# Patient Record
Sex: MALE | Race: BLACK OR AFRICAN AMERICAN | Employment: OTHER | URBAN - METROPOLITAN AREA
[De-identification: names, ages, dates, MRNs, and addresses within clinical notes are randomized per-mention and may not be internally consistent; named-entity substitution may affect disease eponyms.]

---

## 2017-04-14 ENCOUNTER — OP HISTORICAL/CONVERTED ENCOUNTER (OUTPATIENT)
Dept: OTHER | Age: 72
End: 2017-04-14

## 2017-05-12 ENCOUNTER — OP HISTORICAL/CONVERTED ENCOUNTER (OUTPATIENT)
Dept: OTHER | Age: 72
End: 2017-05-12

## 2017-11-11 ENCOUNTER — IP HISTORICAL/CONVERTED ENCOUNTER (OUTPATIENT)
Dept: OTHER | Age: 72
End: 2017-11-11

## 2020-10-02 ENCOUNTER — TELEPHONE (OUTPATIENT)
Dept: INTERNAL MEDICINE CLINIC | Age: 75
End: 2020-10-02

## 2020-10-02 RX ORDER — ISOSORBIDE MONONITRATE 30 MG/1
30 TABLET, EXTENDED RELEASE ORAL
Qty: 90 TAB | Refills: 3 | Status: SHIPPED | OUTPATIENT
Start: 2020-10-02 | End: 2021-09-07

## 2020-10-02 NOTE — TELEPHONE ENCOUNTER
Patient came into the office and needs a refill on his Isosorbide Mono ER Tabs 30mg 1 tab daily sent to [facility] - pt has appt 10/19/2020 8AM

## 2020-10-16 PROBLEM — N18.6 END STAGE RENAL FAILURE ON DIALYSIS (HCC): Status: ACTIVE | Noted: 2020-10-16

## 2020-10-16 PROBLEM — I50.9 CHF (CONGESTIVE HEART FAILURE) (HCC): Status: ACTIVE | Noted: 2020-10-16

## 2020-10-16 PROBLEM — D64.9 ANEMIA: Status: ACTIVE | Noted: 2020-10-16

## 2020-10-16 PROBLEM — I49.5 SINUS NODE DYSFUNCTION (HCC): Status: ACTIVE | Noted: 2020-10-16

## 2020-10-16 PROBLEM — J44.9 COPD (CHRONIC OBSTRUCTIVE PULMONARY DISEASE) (HCC): Status: ACTIVE | Noted: 2020-10-16

## 2020-10-16 PROBLEM — K11.7 EXCESSIVE SALIVATION: Status: ACTIVE | Noted: 2020-10-16

## 2020-10-16 PROBLEM — I10 ESSENTIAL HYPERTENSION: Status: ACTIVE | Noted: 2020-10-16

## 2020-10-16 PROBLEM — M25.559 HIP PAIN: Status: ACTIVE | Noted: 2020-10-16

## 2020-10-16 PROBLEM — Z95.0 CARDIAC PACEMAKER IN SITU: Status: ACTIVE | Noted: 2020-10-16

## 2020-10-16 PROBLEM — M70.20 OLECRANON BURSITIS: Status: ACTIVE | Noted: 2020-10-16

## 2020-10-16 PROBLEM — Z90.2 HISTORY OF LOBECTOMY OF LUNG: Status: ACTIVE | Noted: 2020-10-16

## 2020-10-16 PROBLEM — R07.81 PLEURITIC PAIN: Status: ACTIVE | Noted: 2020-10-16

## 2020-10-16 PROBLEM — Z99.2 END STAGE RENAL FAILURE ON DIALYSIS (HCC): Status: ACTIVE | Noted: 2020-10-16

## 2020-10-16 PROBLEM — I25.10 CORONARY ARTERIOSCLEROSIS IN NATIVE ARTERY: Status: ACTIVE | Noted: 2020-10-16

## 2020-10-16 PROBLEM — J84.112 IDIOPATHIC PULMONARY FIBROSIS (HCC): Status: ACTIVE | Noted: 2020-10-16

## 2020-10-19 ENCOUNTER — OFFICE VISIT (OUTPATIENT)
Dept: INTERNAL MEDICINE CLINIC | Age: 75
End: 2020-10-19
Payer: MEDICARE

## 2020-10-19 VITALS
WEIGHT: 161.2 LBS | SYSTOLIC BLOOD PRESSURE: 150 MMHG | BODY MASS INDEX: 25.91 KG/M2 | TEMPERATURE: 98 F | HEART RATE: 72 BPM | HEIGHT: 66 IN | RESPIRATION RATE: 18 BRPM | DIASTOLIC BLOOD PRESSURE: 60 MMHG | OXYGEN SATURATION: 99 %

## 2020-10-19 DIAGNOSIS — Z90.2 HISTORY OF LOBECTOMY OF LUNG: ICD-10-CM

## 2020-10-19 DIAGNOSIS — I49.5 SINUS NODE DYSFUNCTION (HCC): ICD-10-CM

## 2020-10-19 DIAGNOSIS — N18.6 END STAGE RENAL FAILURE ON DIALYSIS (HCC): ICD-10-CM

## 2020-10-19 DIAGNOSIS — J84.112 IDIOPATHIC PULMONARY FIBROSIS (HCC): ICD-10-CM

## 2020-10-19 DIAGNOSIS — Z85.118 HISTORY OF LUNG CANCER: ICD-10-CM

## 2020-10-19 DIAGNOSIS — I25.10 CORONARY ARTERIOSCLEROSIS IN NATIVE ARTERY: ICD-10-CM

## 2020-10-19 DIAGNOSIS — Z99.2 END STAGE RENAL FAILURE ON DIALYSIS (HCC): ICD-10-CM

## 2020-10-19 DIAGNOSIS — Z95.0 CARDIAC PACEMAKER IN SITU: ICD-10-CM

## 2020-10-19 DIAGNOSIS — I10 ESSENTIAL HYPERTENSION: Primary | ICD-10-CM

## 2020-10-19 PROCEDURE — 1101F PT FALLS ASSESS-DOCD LE1/YR: CPT | Performed by: INTERNAL MEDICINE

## 2020-10-19 PROCEDURE — G8510 SCR DEP NEG, NO PLAN REQD: HCPCS | Performed by: INTERNAL MEDICINE

## 2020-10-19 PROCEDURE — 3017F COLORECTAL CA SCREEN DOC REV: CPT | Performed by: INTERNAL MEDICINE

## 2020-10-19 PROCEDURE — G8419 CALC BMI OUT NRM PARAM NOF/U: HCPCS | Performed by: INTERNAL MEDICINE

## 2020-10-19 PROCEDURE — 99214 OFFICE O/P EST MOD 30 MIN: CPT | Performed by: INTERNAL MEDICINE

## 2020-10-19 PROCEDURE — G8427 DOCREV CUR MEDS BY ELIG CLIN: HCPCS | Performed by: INTERNAL MEDICINE

## 2020-10-19 PROCEDURE — G8536 NO DOC ELDER MAL SCRN: HCPCS | Performed by: INTERNAL MEDICINE

## 2020-10-19 RX ORDER — ALBUTEROL SULFATE 90 UG/1
2 AEROSOL, METERED RESPIRATORY (INHALATION) AS NEEDED
COMMUNITY
End: 2020-10-19 | Stop reason: SDUPTHER

## 2020-10-19 RX ORDER — ALBUTEROL SULFATE 90 UG/1
2 AEROSOL, METERED RESPIRATORY (INHALATION)
Qty: 3 INHALER | Refills: 3 | Status: SHIPPED | OUTPATIENT
Start: 2020-10-19 | End: 2021-10-28

## 2020-10-19 RX ORDER — NIFEDIPINE 60 MG/1
60 TABLET, EXTENDED RELEASE ORAL 2 TIMES DAILY
Qty: 180 TAB | Refills: 3 | Status: SHIPPED | OUTPATIENT
Start: 2020-10-19 | End: 2021-09-21

## 2020-10-19 RX ORDER — ACETAMINOPHEN 325 MG/1
1 TABLET ORAL AS NEEDED
COMMUNITY

## 2020-10-19 RX ORDER — CLONIDINE HYDROCHLORIDE 0.1 MG/1
1 TABLET ORAL 2 TIMES DAILY
COMMUNITY
Start: 2020-09-20 | End: 2020-12-29

## 2020-10-19 RX ORDER — NIFEDIPINE 60 MG/1
1 TABLET, EXTENDED RELEASE ORAL 2 TIMES DAILY
COMMUNITY
End: 2020-10-19 | Stop reason: SDUPTHER

## 2020-10-19 RX ORDER — ASPIRIN 81 MG/1
1 TABLET ORAL DAILY
COMMUNITY

## 2020-10-19 RX ORDER — ERGOCALCIFEROL (VITAMIN D2) 10 MCG
1 TABLET ORAL DAILY
COMMUNITY

## 2020-10-19 RX ORDER — LABETALOL 200 MG/1
1 TABLET, FILM COATED ORAL 3 TIMES DAILY
COMMUNITY
End: 2020-12-29

## 2020-10-19 RX ORDER — MINOXIDIL 2.5 MG/1
1 TABLET ORAL 2 TIMES DAILY
COMMUNITY
Start: 2020-09-20 | End: 2020-12-29

## 2020-10-19 RX ORDER — LANOLIN ALCOHOL/MO/W.PET/CERES
1 CREAM (GRAM) TOPICAL DAILY
COMMUNITY

## 2020-10-19 RX ORDER — HYDRALAZINE HYDROCHLORIDE 25 MG/1
1 TABLET, FILM COATED ORAL 3 TIMES DAILY
COMMUNITY
Start: 2020-09-20 | End: 2020-12-29

## 2020-10-19 NOTE — PROGRESS NOTES
Shantell Segal is a 77 y.o. male and presents with Hypertension and Follow Up Chronic Condition    Overall he is feeling good even though he has multiple medical problems related to kidney heart and lung he is getting dialysis 3 days a week, Tuesday Thursday and Saturday he also follows oncologist at [facility], under [facility] with Dr. Basil Snow according to him he had cancer of the lung and then it spread to the lymph node he finished chemotherapy going to have repeat CT scan, he was told he does not need radiation therapy for now but, further treatment will be decided after having CT scan results he has been consulted by cardiologist Dr. Ajay Lantigua, he is having pacemaker, going to have a pacemaker interrogation overall he is able to walk 1 mile per day without getting short of breath he never misses dialysis, he has no fever chills cough he ran out of rescue inhaler he cannot afford maintenance inhaler he has, upcoming appointment with his lung doctor Dr. Norma Fong, who provide the same both inhalers I sent request for rescue inhaler to the pharmacy he does not have depression, he helps himself and his wife who had surgery on her right arm, he has fair appetite he is going to have flu shot at dialysis center he refuses today according to him he has taken pneumonia shot he does not recall  it is Prevnar. 13 or Pneumovax 23    I explained and education given. His blood pressure is, systolic slightly high but I will not make changes otherwise it is controlled he is on multiple medications. ,  Denies dizziness . Review of Systems    Review of Systems   Constitutional: Negative. HENT: Negative for sinus pain. Eyes: Negative for blurred vision. Respiratory: Negative. Cardiovascular: Negative. Gastrointestinal: Negative. Genitourinary: Negative. Musculoskeletal: Negative. Neurological: Negative for dizziness, tingling, tremors, sensory change and headaches.    Endo/Heme/Allergies: Negative for polydipsia. Psychiatric/Behavioral: Negative for depression and substance abuse. The patient does not have insomnia. Past Medical History:   Diagnosis Date    Anemia 10/16/2020    Cardiac pacemaker in situ 10/16/2020    CHF (congestive heart failure) ([facility] [address] 10/16/2020    COPD (chronic obstructive pulmonary disease) ([facility] [address] 10/16/2020    Coronary arteriosclerosis in native artery 10/16/2020    End stage renal failure on dialysis [facility]) 10/16/2020    Essential hypertension 10/16/2020    Excessive salivation 10/16/2020    H/O hernia repair 03/27/2013    Hip pain 10/16/2020    History of lobectomy of lung 10/16/2020    History of lobectomy of lung     Idiopathic pulmonary fibrosis ([facility] [address] 10/16/2020    Olecranon bursitis 10/16/2020    Pleuritic pain 10/16/2020    Sinus node dysfunction ([facility] [address] 10/16/2020     Past Surgical History:   Procedure Laterality Date    HX COLONOSCOPY      HX PACEMAKER      HX PACEMAKER PLACEMENT      VASCULAR SURGERY PROCEDURE UNLIST       Social History     Socioeconomic History    Marital status:      Spouse name: Not on file    Number of children: Not on file    Years of education: Not on file    Highest education level: Not on file   Tobacco Use    Smoking status: Former Smoker    Smokeless tobacco: Never Used    Tobacco comment: QUIT 1970   Substance and Sexual Activity    Alcohol use: Not Currently    Drug use: Never     Family History   Problem Relation Age of Onset    Diabetes Mother      Current Outpatient Medications   Medication Sig Dispense Refill    hydrALAZINE (APRESOLINE) 25 mg tablet Take 1 Tab by mouth three (3) times daily.  minoxidiL (LONITEN) 2.5 mg tablet Take 1 Tab by mouth two (2) times a day.  cyanocobalamin 1,000 mcg tablet Take 1 Tab by mouth daily.  aspirin delayed-release 81 mg tablet Take 1 Tab by mouth daily.  multivit-min/FA/lycopen/lutein (CENTRUM SILVER MEN PO) Take 1 Tab by mouth daily.       ergocalciferol, vitamin d2, 10 mcg (400 unit) tab Take 1 Tab by mouth daily.  labetaloL (NORMODYNE) 200 mg tablet Take 1 Tab by mouth three (3) times daily.  NIFEdipine ER (ADALAT CC) 60 mg ER tablet Take 1 Tab by mouth two (2) times a day.  acetaminophen (TYLENOL) 325 mg tablet Take 1 Tab by mouth as needed.  albuterol (PROVENTIL HFA, VENTOLIN HFA, PROAIR HFA) 90 mcg/actuation inhaler Take 2 Puffs by inhalation every four (4) hours as needed for Wheezing. Indications: asthma attack 3 Inhaler 3    isosorbide mononitrate ER (IMDUR) 30 mg tablet Take 1 Tab by mouth every morning. Indications: prevention of anginal chest pain associated with coronary artery disease 90 Tab 3    calcium acetate,phosphat bind, (PHOSLYRA) 667 mg (169 mg calcium)/5 mL soln ORAL SOLUTION Take 1 Tab by mouth three (3) times daily.  cloNIDine HCL (CATAPRES) 0.1 mg tablet Take 1 Tab by mouth two (2) times a day. Allergies   Allergen Reactions    Pcn [Penicillins] Nausea and Vomiting     ALSO CAUSES HA'S       Objective:  Visit Vitals  BP (!) 150/60 (BP 1 Location: Right arm, BP Patient Position: Sitting)   Pulse 72   Temp 98 °F (36.7 °C) (Temporal)   Resp 18   Ht 5' 6\" (1.676 m)   Wt 161 lb 3.2 oz (73.1 kg)   SpO2 99%   BMI 26.02 kg/m²       Physical Exam:   Constitutional: General Appearance: Age-appropriate pleasant personality. Level of Distress: NAD. Psychiatric: Mental Status: normal mood and affect Orientation: to time, place, and person. ,normal eye contact. Head: Head: normocephalic and atraumatic. Eyes: Pupils: PERRLA. Sclerae: non-icteric. Neck: Neck: supple, trachea midline, and no masses. Lymph Nodes: no cervical LAD. Thyroid: no enlargement or nodules and non-tender. Lungs: Respiratory effort: no dyspnea. Auscultation: no wheezing, rales/crackles, or rhonchi and breath sounds normal and good air movement. Cardiovascular: Apical Impulse: not displaced.  Heart Auscultation: normal S1 and S2; no murmurs, rubs, or gallops; and RRR. Neck vessels: no carotid bruits. Pulses including femoral / pedal: normal throughout. Abdomen: Bowel Sounds: normal. Inspection and Palpation: no tenderness, guarding, or masses and soft and non-distended. Liver: non-tender and no hepatomegaly. Spleen: non-tender and no splenomegaly. Musculoskeletal[de-identified] Extremities: no edema,no varicosities. No Calf tenderness. AV fistula left forearm  Neurologic: Gait and Station: normal gait and station. Motor Strength normal right and left. Skin: Inspection and palpation: no rash, lesions, or ulcer. No results found for this or any previous visit. Assessment/Plan:      ICD-10-CM ICD-9-CM    1. Essential hypertension  I10 401.9    2. End stage renal failure on dialysis (HCC)  N18.6 585.6     Z99.2 V45.11    3. Idiopathic pulmonary fibrosis ([facility]  J84.112 516.31    4. History of lung cancer  Z85. 118 V10.11    5. History of lobectomy of lung  Z90.2 V12.59    6. Coronary arteriosclerosis in native artery  I25.10 414.01    7. Cardiac pacemaker in situ  Z95.0 V45.01    8. Sinus node dysfunction (HCC)  I49.5 427.81      Orders Placed This Encounter    calcium acetate,phosphat bind, (PHOSLYRA) 667 mg (169 mg calcium)/5 mL soln ORAL SOLUTION     Sig: Take 1 Tab by mouth three (3) times daily.  cloNIDine HCL (CATAPRES) 0.1 mg tablet     Sig: Take 1 Tab by mouth two (2) times a day.  hydrALAZINE (APRESOLINE) 25 mg tablet     Sig: Take 1 Tab by mouth three (3) times daily.  minoxidiL (LONITEN) 2.5 mg tablet     Sig: Take 1 Tab by mouth two (2) times a day.  cyanocobalamin 1,000 mcg tablet     Sig: Take 1 Tab by mouth daily.  aspirin delayed-release 81 mg tablet     Sig: Take 1 Tab by mouth daily.  multivit-min/FA/lycopen/lutein (CENTRUM SILVER MEN PO)     Sig: Take 1 Tab by mouth daily.  ergocalciferol, vitamin d2, 10 mcg (400 unit) tab     Sig: Take 1 Tab by mouth daily.     labetaloL (NORMODYNE) 200 mg tablet Sig: Take 1 Tab by mouth three (3) times daily.  NIFEdipine ER (ADALAT CC) 60 mg ER tablet     Sig: Take 1 Tab by mouth two (2) times a day.  acetaminophen (TYLENOL) 325 mg tablet     Sig: Take 1 Tab by mouth as needed.  DISCONTD: albuterol (PROVENTIL HFA, VENTOLIN HFA, PROAIR HFA) 90 mcg/actuation inhaler     Sig: Take 2 Puffs by inhalation as needed.  albuterol (PROVENTIL HFA, VENTOLIN HFA, PROAIR HFA) 90 mcg/actuation inhaler     Sig: Take 2 Puffs by inhalation every four (4) hours as needed for Wheezing. Indications: asthma attack     Dispense:  3 Inhaler     Refill:  3       Hypertension  systolic is slightly high but overall it is controlle taking dialysis 3 days a week and having heart conditiond overall it remains controlled I will not make changes continued on hydralazine 25 mg every 8 hourly, minoxidil 2.5 mg twice a day, labetalol 200 mg 3 times a day, nifedipine ER 60 mg twice a day, and clonidine 0.1 mg twice a day. Renal diet he told me he is compliant taking renal diet.    , End-stage renal disease on hemodialysis taking Tuesday Thursday and Saturday he has AV fistula in left forearm, he is under care of nephrologist he never misses dialysis and he is particular regarding his diet. Also having history of CHF in the past.    Coronary artery disease with history of CHF and history of sinus  arrhythmia and status post pacemaker recently he had, visit with cardiologist, he is going to have pacemaker interrogation. Cardiac diet. Continue low-dose aspirin. History of COPD and lung fibrosis and history of lung cancer in left side and spread to the lymph node he follows [facility]. Louise [facility], awaiting the notes, for the most recent notes according to him he finished chemotherapy last week and going to have CT scan depending on the CT scan further plan will be decided by his oncologist and according to him he does not need radiation therapy.     Having COPD he needs rescue and maintenance inhaler he is having appointment with his pulmonologist and I gave him refills for rescue inhaler, he is going to discuss regarding the cost of the maintenance inhaler that he cannot afford with his pulmonologist.    He is on polypharmacy but he is compliant taking medicines continue multivitamin with renal diet and heart healthy diet and if any concerns do not hesitate to call me follow-up in 4 weeks. He had registered for getting flu shot at his dialysis clinic so he refused today here I explained him and educated about Prevnar 13 Pneumovax, and he claimed that he has taken one of the pneumonia vaccine so he will find out and then the second 1 will be given and also he told me that he was under chemo so he is not a candidate to be on Shingrix but he refuses to get shingles vaccine. Follow-up in 4 months. Education and counseling given. follow low fat diet, follow low salt diet, continue present plan, call if any problems    There are no Patient Instructions on file for this visit. Follow-up and Dispositions    · Return in about 4 months (around 2/19/2021) for htn ,esrd,copd ,lung and heart problems.

## 2020-10-19 NOTE — TELEPHONE ENCOUNTER
Patient stated Dr. Teo Calderón wanted to know what medication he needs refill and he stated he needs Nifedipine ER 60 mg. He has other medications but is out of the above now. Please send to [facility].

## 2021-02-19 ENCOUNTER — OFFICE VISIT (OUTPATIENT)
Dept: INTERNAL MEDICINE CLINIC | Age: 76
End: 2021-02-19
Payer: MEDICARE

## 2021-02-19 VITALS
TEMPERATURE: 98.5 F | OXYGEN SATURATION: 99 % | HEART RATE: 72 BPM | BODY MASS INDEX: 25.84 KG/M2 | SYSTOLIC BLOOD PRESSURE: 158 MMHG | HEIGHT: 66 IN | WEIGHT: 160.8 LBS | RESPIRATION RATE: 20 BRPM | DIASTOLIC BLOOD PRESSURE: 74 MMHG

## 2021-02-19 DIAGNOSIS — J84.112 IDIOPATHIC PULMONARY FIBROSIS (HCC): ICD-10-CM

## 2021-02-19 DIAGNOSIS — I50.9 CONGESTIVE HEART FAILURE, UNSPECIFIED HF CHRONICITY, UNSPECIFIED HEART FAILURE TYPE (HCC): ICD-10-CM

## 2021-02-19 DIAGNOSIS — Z90.2 HISTORY OF LOBECTOMY OF LUNG: ICD-10-CM

## 2021-02-19 DIAGNOSIS — I49.5 SINUS NODE DYSFUNCTION (HCC): ICD-10-CM

## 2021-02-19 DIAGNOSIS — J44.9 CHRONIC OBSTRUCTIVE PULMONARY DISEASE, UNSPECIFIED COPD TYPE (HCC): ICD-10-CM

## 2021-02-19 DIAGNOSIS — Z99.2 END STAGE RENAL FAILURE ON DIALYSIS (HCC): ICD-10-CM

## 2021-02-19 DIAGNOSIS — Z95.0 CARDIAC PACEMAKER IN SITU: ICD-10-CM

## 2021-02-19 DIAGNOSIS — N18.6 END STAGE RENAL FAILURE ON DIALYSIS (HCC): ICD-10-CM

## 2021-02-19 DIAGNOSIS — Z85.118 HISTORY OF LUNG CANCER: ICD-10-CM

## 2021-02-19 DIAGNOSIS — I10 ESSENTIAL HYPERTENSION: ICD-10-CM

## 2021-02-19 DIAGNOSIS — I25.10 CORONARY ARTERIOSCLEROSIS IN NATIVE ARTERY: ICD-10-CM

## 2021-02-19 PROCEDURE — 99214 OFFICE O/P EST MOD 30 MIN: CPT | Performed by: INTERNAL MEDICINE

## 2021-02-19 PROCEDURE — G8419 CALC BMI OUT NRM PARAM NOF/U: HCPCS | Performed by: INTERNAL MEDICINE

## 2021-02-19 PROCEDURE — G8510 SCR DEP NEG, NO PLAN REQD: HCPCS | Performed by: INTERNAL MEDICINE

## 2021-02-19 PROCEDURE — G8427 DOCREV CUR MEDS BY ELIG CLIN: HCPCS | Performed by: INTERNAL MEDICINE

## 2021-02-19 PROCEDURE — 3017F COLORECTAL CA SCREEN DOC REV: CPT | Performed by: INTERNAL MEDICINE

## 2021-02-19 PROCEDURE — G8536 NO DOC ELDER MAL SCRN: HCPCS | Performed by: INTERNAL MEDICINE

## 2021-02-19 PROCEDURE — 1101F PT FALLS ASSESS-DOCD LE1/YR: CPT | Performed by: INTERNAL MEDICINE

## 2021-02-19 PROCEDURE — G9231 DOC ESRD DIA TRANS PREG: HCPCS | Performed by: INTERNAL MEDICINE

## 2021-02-19 NOTE — PROGRESS NOTES
Kimberlee Gonzales is a 77 y.o. male and presents with Follow-up, Hypertension, CHF, Anemia, and COPD     initially he wanted to have virtual visit however he could not do, he preferred to come in the office in person he is getting hemodialysis on, Tuesday Thursday and Saturday he has history of lobectomy of lung due to history of lung cancer, and also history of COPD with pulmonary fibrosis he is able to walk without any oxygen and no shortness of breath, no PND or chest pain, he is having stable, vitals his systolic is slightly elevated but he is getting dialysis and it is, controlled on 5 different antihypertensive medicines he has AV fistula in left forearm, no depression, he has going to have, CT scan of the neck and, and he had spread of the lesion to the lymph node but he  is under surveillance by oncologist.  He also follows cardiologist Dr. Ke Cam and follows pulmonologist.  He has no depression. He told me he is able to do his ADL and IADL and able to drive and he is willing to get COVID-19 vaccine but not desperate to get it. He is up-to-date with flu shot and pneumonia shot      Review of Systems    Review of Systems   Constitutional: Negative. HENT: Negative for sinus pain and sore throat. Eyes: Negative for blurred vision. Respiratory: Negative for cough, shortness of breath and wheezing. Cardiovascular: Negative. Gastrointestinal: Negative. Genitourinary: Negative. Musculoskeletal: Negative. Neurological: Negative for dizziness, tingling, tremors and headaches. Sensory change: monday    Endo/Heme/Allergies: Negative for polydipsia. Psychiatric/Behavioral: Negative for depression and memory loss. The patient is not nervous/anxious.          Past Medical History:   Diagnosis Date    Anemia 10/16/2020    Cardiac pacemaker in situ 10/16/2020    CHF (congestive heart failure) ([facility] [address] 10/16/2020    COPD (chronic obstructive pulmonary disease) ([facility] [address] 10/16/2020    Coronary arteriosclerosis in native artery 10/16/2020    End stage renal failure on dialysis [facility]) 10/16/2020    Essential hypertension 10/16/2020    Excessive salivation 10/16/2020    H/O hernia repair 03/27/2013    Hip pain 10/16/2020    History of lobectomy of lung 10/16/2020    History of lobectomy of lung     Idiopathic pulmonary fibrosis ([facility] [address] 10/16/2020    Olecranon bursitis 10/16/2020    Pleuritic pain 10/16/2020    Sinus node dysfunction ([facility] [address] 10/16/2020     Past Surgical History:   Procedure Laterality Date    HX COLONOSCOPY      HX PACEMAKER      HX PACEMAKER PLACEMENT      VASCULAR SURGERY PROCEDURE UNLIST       Social History     Socioeconomic History    Marital status:      Spouse name: Not on file    Number of children: Not on file    Years of education: Not on file    Highest education level: Not on file   Tobacco Use    Smoking status: Former Smoker    Smokeless tobacco: Never Used    Tobacco comment: QUIT 1970   Substance and Sexual Activity    Alcohol use: Not Currently     Frequency: Never     Binge frequency: Never    Drug use: Never     Family History   Problem Relation Age of Onset    Diabetes Mother      Current Outpatient Medications   Medication Sig Dispense Refill    cloNIDine HCL (CATAPRES) 0.1 mg tablet TAKE 1 TABLET EVERY 12 HOURS AS DIRECTED 180 Tab 1    hydrALAZINE (APRESOLINE) 25 mg tablet TAKE 1 TABLET EVERY 8 HOURS AS DIRECTED 270 Tab 1    labetaloL (NORMODYNE) 200 mg tablet TAKE 1 TABLET THREE TIMES A DAY AS DIRECTED 270 Tab 1    minoxidiL (LONITEN) 2.5 mg tablet TAKE 1 TABLET EVERY 12 HOURS AS DIRECTED 180 Tab 1    calcium acetate,phosphat bind, (PHOSLYRA) 667 mg (169 mg calcium)/5 mL soln ORAL SOLUTION Take 1 Tab by mouth three (3) times daily.  cyanocobalamin 1,000 mcg tablet Take 1 Tab by mouth daily.  aspirin delayed-release 81 mg tablet Take 1 Tab by mouth daily.       multivit-min/FA/lycopen/lutein (CENTRUM SILVER MEN PO) Take 1 Tab by mouth daily.  ergocalciferol, vitamin d2, 10 mcg (400 unit) tab Take 1 Tab by mouth daily.  acetaminophen (TYLENOL) 325 mg tablet Take 1 Tab by mouth as needed.  albuterol (PROVENTIL HFA, VENTOLIN HFA, PROAIR HFA) 90 mcg/actuation inhaler Take 2 Puffs by inhalation every four (4) hours as needed for Wheezing. Indications: asthma attack 3 Inhaler 3    NIFEdipine ER (ADALAT CC) 60 mg ER tablet Take 1 Tab by mouth two (2) times a day. Indications: high blood pressure 180 Tab 3    isosorbide mononitrate ER (IMDUR) 30 mg tablet Take 1 Tab by mouth every morning. Indications: prevention of anginal chest pain associated with coronary artery disease 90 Tab 3     Allergies   Allergen Reactions    Pcn [Penicillins] Nausea and Vomiting     ALSO CAUSES HA'S       Objective:  Visit Vitals  BP (!) 158/74 (BP 1 Location: Right upper arm, BP Patient Position: Sitting, BP Cuff Size: Adult)   Pulse 72   Temp 98.5 °F (36.9 °C) (Oral)   Resp 20   Ht 5' 6\" (1.676 m)   Wt 160 lb 12.8 oz (72.9 kg)   SpO2 99% Comment: RA   BMI 25.95 kg/m²       Physical Exam:   Constitutional: General Appearance: Pleasant. Level of Distress: NAD. Psychiatric: Mental Status: normal mood and affect Orientation: to time, place, and person. ,normal eye contact. Head: Head: normocephalic and atraumatic. Eyes: Pupils: PERRLA. Sclerae: non-icteric. Neck: Neck: supple, trachea midline, and no masses. Lymph Nodes: no cervical LAD. Thyroid: no enlargement or nodules and non-tender. Lungs: Respiratory effort: no dyspnea. Auscultation: no wheezing, rales/crackles, or rhonchi and breath sounds normal and good air movement. Cardiovascular: Apical Impulse: not displaced. Heart Auscultation: normal S1 and S2; no murmurs, rubs, or gallops; and RRR. Neck vessels: no carotid bruits. Pulses including femoral / pedal: normal throughout. Abdomen:  Bowel Sounds: normal. Inspection and Palpation: no tenderness, guarding, or masses and soft and non-distended. Liver: non-tender and no hepatomegaly. Spleen: non-tender and no splenomegaly. Musculoskeletal[de-identified] Extremities: no edema,no varicosities. No Calf tenderness. AV fistula in left forearm  Neurologic: Gait and Station: normal gait and station. Motor Strength normal right and left. Skin: Inspection and palpation: no rash, lesions, or ulcer. He had fallen in his ramp in his house but no, bruise no contusion, normal walking normal gait and he told me he does not have any injury or concern. No results found for this or any previous visit. Assessment/Plan:      ICD-10-CM ICD-9-CM    1. Essential hypertension  I10 401.9    2. End stage renal failure on dialysis (HCC)  N18.6 585.6     Z99.2 V45.11    3. Chronic obstructive pulmonary disease, unspecified COPD type ([facility]  J44.9 496    4. Congestive heart failure, unspecified HF chronicity, unspecified heart failure type ([facility])  I50.9 428.0    5. Coronary arteriosclerosis in native artery  I25.10 414.01    6. Idiopathic pulmonary fibrosis ([facility]  J84.112 516.31    7. History of lung cancer  Z85. 118 V10.11    8. Cardiac pacemaker in situ  Z95.0 V45.01    9. History of lobectomy of lung  Z90.2 V12.59    10. Sinus node dysfunction ([facility])  I49.5 427.81      No orders of the defined types were placed in this encounter. Hypertension controlled on 5 different antihypertensives also having ESRD getting hemodialysis. Continued on hydralazine 25 mg 3 times a day, labetalol 200 mg 3 times a day, minoxidil 2.5 mg twice daily, nifedipine ER 60 mg twice a day, clonidine 0.1 mg twice a day. Renal diet. His blood  pressure slightly fluctuates on higher side but I will not make changes in the medicine usually it remains normal and getting dialysis I will not change the dose,. History of coronary artery disease follows Dr. Trevor Mary getting isosorbide mononitrate ER.     COPD with history of pulmonary fibrosis and history of lung cancer status post lobectomy of the lung also  follows pulmonologist, continued on rescue and maintenance inhaler he does not get short of breath with walking or no history of PND or orthopnea. Able to do ADL and IADL without need of oxygen. ESRD getting dialysis on Tuesday Thursday and Saturday. Follow the protocol by renal dialysis clinic up-to-date with his immunization. ,    History of sinus node dysfunction having pacemaker. History of lung cancer survivor follows oncologist at [facility] and he had spread in lymph node and getting CT scan again for surveillance on Monday. He is up-to-date with flu vaccine and pneumonia vaccine recommended to register for Covid vaccine in our office he told me he is not desperate. He slipped on the ramp at his house but he did not break anything no injury no contusion no pain no bruise and he is fine he told me he does not need to worry. He had sprinkle salt. Follow-up in 6 months. Continued on current medicines he is kept up to date with his refills. continue present plan, call if any problems    There are no Patient Instructions on file for this visit. Follow-up and Dispositions    · Return in about 6 months (around 8/19/2021) for htn ,esrd ,med monitoring and refills.

## 2021-03-11 RX ORDER — GLYCOPYRROLATE 1 MG/1
TABLET ORAL
Qty: 180 TAB | Refills: 3 | Status: SHIPPED | OUTPATIENT
Start: 2021-03-11

## 2021-08-20 ENCOUNTER — OFFICE VISIT (OUTPATIENT)
Dept: INTERNAL MEDICINE CLINIC | Age: 76
End: 2021-08-20
Payer: MEDICARE

## 2021-08-20 VITALS
SYSTOLIC BLOOD PRESSURE: 164 MMHG | RESPIRATION RATE: 16 BRPM | HEIGHT: 66 IN | TEMPERATURE: 98.1 F | OXYGEN SATURATION: 96 % | BODY MASS INDEX: 25.23 KG/M2 | WEIGHT: 157 LBS | HEART RATE: 69 BPM | DIASTOLIC BLOOD PRESSURE: 80 MMHG

## 2021-08-20 DIAGNOSIS — J44.9 CHRONIC OBSTRUCTIVE PULMONARY DISEASE, UNSPECIFIED COPD TYPE (HCC): ICD-10-CM

## 2021-08-20 DIAGNOSIS — I50.9 CONGESTIVE HEART FAILURE, UNSPECIFIED HF CHRONICITY, UNSPECIFIED HEART FAILURE TYPE (HCC): ICD-10-CM

## 2021-08-20 DIAGNOSIS — I49.5 SINUS NODE DYSFUNCTION (HCC): ICD-10-CM

## 2021-08-20 DIAGNOSIS — Z90.2 HISTORY OF LOBECTOMY OF LUNG: ICD-10-CM

## 2021-08-20 DIAGNOSIS — N18.6 END STAGE RENAL FAILURE ON DIALYSIS (HCC): ICD-10-CM

## 2021-08-20 DIAGNOSIS — I25.10 CORONARY ARTERIOSCLEROSIS IN NATIVE ARTERY: ICD-10-CM

## 2021-08-20 DIAGNOSIS — Z99.2 END STAGE RENAL FAILURE ON DIALYSIS (HCC): ICD-10-CM

## 2021-08-20 DIAGNOSIS — Z85.118 HISTORY OF LUNG CANCER: ICD-10-CM

## 2021-08-20 DIAGNOSIS — I10 ESSENTIAL HYPERTENSION: ICD-10-CM

## 2021-08-20 DIAGNOSIS — Z95.0 CARDIAC PACEMAKER IN SITU: ICD-10-CM

## 2021-08-20 DIAGNOSIS — K11.7 EXCESSIVE SALIVATION: ICD-10-CM

## 2021-08-20 DIAGNOSIS — J84.112 IDIOPATHIC PULMONARY FIBROSIS (HCC): ICD-10-CM

## 2021-08-20 PROCEDURE — 1101F PT FALLS ASSESS-DOCD LE1/YR: CPT | Performed by: INTERNAL MEDICINE

## 2021-08-20 PROCEDURE — G8427 DOCREV CUR MEDS BY ELIG CLIN: HCPCS | Performed by: INTERNAL MEDICINE

## 2021-08-20 PROCEDURE — 3017F COLORECTAL CA SCREEN DOC REV: CPT | Performed by: INTERNAL MEDICINE

## 2021-08-20 PROCEDURE — G8536 NO DOC ELDER MAL SCRN: HCPCS | Performed by: INTERNAL MEDICINE

## 2021-08-20 PROCEDURE — 99214 OFFICE O/P EST MOD 30 MIN: CPT | Performed by: INTERNAL MEDICINE

## 2021-08-20 PROCEDURE — G8419 CALC BMI OUT NRM PARAM NOF/U: HCPCS | Performed by: INTERNAL MEDICINE

## 2021-08-20 PROCEDURE — G8432 DEP SCR NOT DOC, RNG: HCPCS | Performed by: INTERNAL MEDICINE

## 2021-08-20 PROCEDURE — G9231 DOC ESRD DIA TRANS PREG: HCPCS | Performed by: INTERNAL MEDICINE

## 2021-08-20 NOTE — PROGRESS NOTES
Vu Jeronimo is a 77 y.o. male and presents with Follow Up Chronic Condition, Hypertension, and Lung Cancer (Spread to chest, lower left side and neck. )    Mr. Frankey Eng came for regular follow-up he has started getting chemotherapy, every 3 weekly, for his meta stasis lung cancer he takes at [address] [place] [address]. His blood pressure is elevated but he has not taken any of his medicines he takes breakfast and then he takes. He has AV fistula getting hemodialysis 3 days a week Tuesday, Thursday and Saturday. He told me he does not have any headache dizziness he has a very fair appetite however recently his wife is seeking hospice and he has to help. He is functional.  he was recommended to have radiation therapy for 33 cycles but he refused because it was not possible with his dialysis and now he is getting chemotherapy. Is compliant for medicines. No chest pain shortness of breath. No major weight loss. He has taken 2 doses of COVID-19 vaccine. Pleasant  Review of Systems    Review of Systems   Constitutional: Negative. HENT: Negative for sinus pain and sore throat. Eyes: Negative for blurred vision. Respiratory: Negative for cough and wheezing. Getting chemo every 3 weeks at [facility]. Cardiovascular: Negative. Gastrointestinal: Negative. Genitourinary: Negative for frequency. Getting H? D thro lt forearm AV fistula Tuesday and thurday and Saturday. Musculoskeletal: Negative for neck pain. Neurological: Positive for sensory change. Negative for dizziness, tingling and headaches. Endo/Heme/Allergies: Negative for polydipsia. Psychiatric/Behavioral: Negative for depression and substance abuse. The patient is not nervous/anxious.          Past Medical History:   Diagnosis Date    Anemia 10/16/2020    Cardiac pacemaker in situ 10/16/2020    CHF (congestive heart failure) ([facility] 10/16/2020    COPD (chronic obstructive pulmonary disease) ([facility] 10/16/2020  Coronary arteriosclerosis in native artery 10/16/2020    End stage renal failure on dialysis [facility]) 10/16/2020    Essential hypertension 10/16/2020    Excessive salivation 10/16/2020    H/O hernia repair 03/27/2013    Hip pain 10/16/2020    History of lobectomy of lung 10/16/2020    History of lobectomy of lung     Idiopathic pulmonary fibrosis ([facility] 10/16/2020    Olecranon bursitis 10/16/2020    Pleuritic pain 10/16/2020    [facility] 10/16/2020     Past Surgical History:   Procedure Laterality Date    HX COLONOSCOPY      HX PACEMAKER      HX PACEMAKER PLACEMENT      VASCULAR SURGERY PROCEDURE UNLIST       Social History     Socioeconomic History    Marital status:      Spouse name: Not on file    Number of children: Not on file    Years of education: Not on file    Highest education level: Not on file   Tobacco Use    Smoking status: Former Smoker    Smokeless tobacco: Never Used    Tobacco comment: QUIT 1970   Vaping Use    Vaping Use: Never used   Substance and Sexual Activity    Alcohol use: Not Currently    Drug use: Never     Social Determinants of Health     Financial Resource Strain:     Difficulty of Paying Living Expenses:    Food Insecurity:     Worried About Running Out of Food in the Last Year:     Ran Out of Food in the Last Year:    Transportation Needs:     Lack of Transportation (Medical):      Lack of Transportation (Non-Medical):    Physical Activity:     Days of Exercise per Week:     Minutes of Exercise per Session:    Stress:     Feeling of Stress :    Social Connections:     Frequency of Communication with Friends and Family:     Frequency of Social Gatherings with Friends and Family:     Attends Alevism Services:     Active Member of Clubs or Organizations:     Attends Club or Organization Meetings:     Marital Status:      Family History   Problem Relation Age of Onset    Diabetes Mother      Current Outpatient Medications Medication Sig Dispense Refill    minoxidiL (LONITEN) 2.5 mg tablet TAKE 1 TABLET EVERY 12 HOURS AS DIRECTED 180 Tablet 2    labetaloL (NORMODYNE) 200 mg tablet TAKE 1 TABLET THREE TIMES A DAY AS DIRECTED 270 Tablet 2    hydrALAZINE (APRESOLINE) 25 mg tablet TAKE 1 TABLET EVERY 8 HOURS AS DIRECTED 270 Tablet 2    cloNIDine HCL (CATAPRES) 0.1 mg tablet TAKE 1 TABLET EVERY 12 HOURS AS DIRECTED 180 Tablet 2    glycopyrrolate (ROBINUL) 1 mg tablet TAKE 1 TABLET TWICE A DAY AS NEEDED 180 Tab 3    calcium acetate,phosphat bind, (PHOSLYRA) 667 mg (169 mg calcium)/5 mL soln ORAL SOLUTION Take 1 Tab by mouth three (3) times daily.  cyanocobalamin 1,000 mcg tablet Take 1 Tab by mouth daily.  aspirin delayed-release 81 mg tablet Take 1 Tab by mouth daily.  multivit-min/FA/lycopen/lutein (CENTRUM SILVER MEN PO) Take 1 Tab by mouth daily.  ergocalciferol, vitamin d2, 10 mcg (400 unit) tab Take 1 Tab by mouth daily.  acetaminophen (TYLENOL) 325 mg tablet Take 1 Tab by mouth as needed.  albuterol (PROVENTIL HFA, VENTOLIN HFA, PROAIR HFA) 90 mcg/actuation inhaler Take 2 Puffs by inhalation every four (4) hours as needed for Wheezing. Indications: asthma attack 3 Inhaler 3    NIFEdipine ER (ADALAT CC) 60 mg ER tablet Take 1 Tab by mouth two (2) times a day. Indications: high blood pressure 180 Tab 3    isosorbide mononitrate ER (IMDUR) 30 mg tablet Take 1 Tab by mouth every morning.  Indications: prevention of anginal chest pain associated with coronary artery disease 90 Tab 3     Allergies   Allergen Reactions    Pcn [Penicillins] Nausea and Vomiting     ALSO CAUSES HA'S       Objective:  Visit Vitals  BP (!) 164/80 (BP 1 Location: Right upper arm, BP Patient Position: Sitting, BP Cuff Size: Adult)   Pulse 69   Temp 98.1 °F (36.7 °C) (Oral)   Resp 16   Ht 5' 6\" (1.676 m)   Wt 157 lb (71.2 kg)   SpO2 96%   BMI 25.34 kg/m²       Physical Exam:   Constitutional: General Appearance: . Level of Distress: NAD. Psychiatric: Mental Status: normal mood and affect Orientation: to time, place, and person. ,normal eye contact. Head: Head: normocephalic and atraumatic. Eyes: Pupils: PERRLA. Sclerae: non-icteric. Neck: Neck: supple, trachea midline, and no masses. Lymph Nodes: no cervical LAD. Thyroid: no enlargement or nodules and non-tender. Lungs: Respiratory effort: no dyspnea. Auscultation: no wheezing, rales/crackles, or rhonchi and breath sounds normal and good air movement. Cardiovascular: Apical Impulse: not displaced. Heart Auscultation: normal S1 and S2; no murmurs, rubs, or gallops; and RRR. Neck vessels: no carotid bruits. Pulses including femoral / pedal: normal throughout. Abdomen: Bowel Sounds: normal. Inspection and Palpation: no tenderness, guarding, or masses and soft and non-distended. Liver: non-tender and no hepatomegaly. Spleen: non-tender and no splenomegaly. Musculoskeletal[de-identified] Extremities: no edema,no varicosities. No Calf tenderness AV fistula in left forearm. Neurologic: Gait and Station: normal gait and station. Motor Strength normal right and left. Skin: Inspection and palpation: no rash, lesions, or ulcer. No results found for this or any previous visit. Assessment/Plan:      ICD-10-CM ICD-9-CM    1. Essential hypertension  I10 401.9    2. Idiopathic pulmonary fibrosis ([facility]  J84.112 516.31    3. End stage renal failure on dialysis (HCC)  N18.6 585.6     Z99.2 V45.11    4. Chronic obstructive pulmonary disease, unspecified COPD type ([facility]  J44.9 496    5. Coronary arteriosclerosis in native artery  I25.10 414.01    6. Congestive heart failure, unspecified HF chronicity, unspecified heart failure type ([facility])  I50.9 428.0    7. History of lobectomy of lung  Z90.2 V12.59    8. Cardiac pacemaker in situ  Z95.0 V45.01    9. History of lung cancer  Z85. 118 V10.11    10. Excessive salivation  K11.7 527.7    11.  Sinus node dysfunction ([facility])  I49.5 427.81      No orders of the defined types were placed in this encounter. Hypertension uncontrolled he has not taken any of his medicines he takes after breakfast she is a dialyze patient  getting dialysis Tuesday Thursday and Saturday so I will not make changes in the medicines he is asymptomatic he takes multiple medicines including nifedipine ER 60 mg/day, labetalol 200 mg 3 times a day, minoxidil 2.5 mg twice a day hydralazine 25 mg 8 hourly, clonidine 0.1 mg twice a day. End-stage renal disease getting hemodialysis Tuesday Thursday and Saturday , he is functional very well. Lung cancer he underwent PET scan in February 2021 and he had spread in probably mediastinum and left lower lung, he is getting chemotherapy every 3 weekly at [facility]/    History of heart disease and CHF getting isosorbide mononitrate ER he does not follow any cardiologist he had consulted in the past.    History of lung fibrosis and COPD getting res continue renal vitamins as recommended by nephrologist.  Cue and maintenance inhaler. History of excessive salivation he takes glycopyrrolate every other day. Health maintenance he has been up to date for 2 doses of Covid vaccine and flu vaccine and pneumonia vaccine he does not want to have shingles vaccine, he is recommended to get tetanus vaccine every 10 yearly and he is not interested to get third dose of Covid vaccine he is undecided. Continue vitamins recommended by nephrologist.    Follow-up in 6 months. continue present plan, call if any problems    There are no Patient Instructions on file for this visit. Follow-up and Dispositions    · Return in about 6 months (around 2/20/2022) for follow up for chronic condition.

## 2021-08-20 NOTE — PROGRESS NOTES
Father informed patient is due for a establish care visit and offered assistance in scheduling, declined to schedule. Reports he started chemotherapy for the 3rd round on 8/17/21    Chief Complaint   Patient presents with    Follow Up Chronic Condition    Hypertension    Lung Cancer     Spread to chest, lower left side and neck. Visit Vitals  BP (!) 183/77 (BP 1 Location: Right upper arm, BP Patient Position: Sitting)   Pulse 69   Temp 98.1 °F (36.7 °C) (Oral)   Resp 16   Ht 5' 6\" (1.676 m)   Wt 157 lb (71.2 kg)   SpO2 96%   BMI 25.34 kg/m²     1. Have you been to the ER, urgent care clinic since your last visit? Hospitalized since your last visit? No    2. Have you seen or consulted any other health care providers outside of the [address] [place] [address] since your last visit? Include any pap smears or colon screening.  Dr. Josette Reed

## 2021-09-07 RX ORDER — ISOSORBIDE MONONITRATE 30 MG/1
TABLET, EXTENDED RELEASE ORAL
Qty: 90 TABLET | Refills: 3 | Status: SHIPPED | OUTPATIENT
Start: 2021-09-07 | End: 2022-08-24 | Stop reason: SDUPTHER

## 2021-10-28 RX ORDER — ALBUTEROL SULFATE 90 UG/1
AEROSOL, METERED RESPIRATORY (INHALATION)
Qty: 25.5 G | Refills: 2 | Status: SHIPPED | OUTPATIENT
Start: 2021-10-28

## 2022-02-20 PROBLEM — I50.9 CHF (CONGESTIVE HEART FAILURE) (HCC): Status: RESOLVED | Noted: 2020-10-16 | Resolved: 2022-02-20

## 2022-02-23 ENCOUNTER — OFFICE VISIT (OUTPATIENT)
Dept: INTERNAL MEDICINE CLINIC | Age: 77
End: 2022-02-23
Payer: MEDICARE

## 2022-02-23 VITALS
OXYGEN SATURATION: 94 % | TEMPERATURE: 98.6 F | SYSTOLIC BLOOD PRESSURE: 134 MMHG | DIASTOLIC BLOOD PRESSURE: 74 MMHG | HEIGHT: 66 IN | RESPIRATION RATE: 20 BRPM | HEART RATE: 72 BPM | BODY MASS INDEX: 25.26 KG/M2 | WEIGHT: 157.2 LBS

## 2022-02-23 DIAGNOSIS — Z90.2 HISTORY OF LOBECTOMY OF LUNG: ICD-10-CM

## 2022-02-23 DIAGNOSIS — Z95.0 CARDIAC PACEMAKER IN SITU: ICD-10-CM

## 2022-02-23 DIAGNOSIS — Z85.118 HISTORY OF LUNG CANCER: ICD-10-CM

## 2022-02-23 DIAGNOSIS — C34.92 ADENOCARCINOMA OF LEFT LUNG (HCC): ICD-10-CM

## 2022-02-23 DIAGNOSIS — J44.9 CHRONIC OBSTRUCTIVE PULMONARY DISEASE, UNSPECIFIED COPD TYPE (HCC): ICD-10-CM

## 2022-02-23 DIAGNOSIS — I10 ESSENTIAL HYPERTENSION: ICD-10-CM

## 2022-02-23 DIAGNOSIS — N18.6 END STAGE RENAL FAILURE ON DIALYSIS (HCC): ICD-10-CM

## 2022-02-23 DIAGNOSIS — I49.5 SINUS NODE DYSFUNCTION (HCC): ICD-10-CM

## 2022-02-23 DIAGNOSIS — J84.112 IDIOPATHIC PULMONARY FIBROSIS (HCC): ICD-10-CM

## 2022-02-23 DIAGNOSIS — Z99.2 END STAGE RENAL FAILURE ON DIALYSIS (HCC): ICD-10-CM

## 2022-02-23 DIAGNOSIS — I25.10 CORONARY ARTERIOSCLEROSIS IN NATIVE ARTERY: ICD-10-CM

## 2022-02-23 PROCEDURE — 99214 OFFICE O/P EST MOD 30 MIN: CPT | Performed by: INTERNAL MEDICINE

## 2022-02-23 PROCEDURE — G8510 SCR DEP NEG, NO PLAN REQD: HCPCS | Performed by: INTERNAL MEDICINE

## 2022-02-23 PROCEDURE — G8419 CALC BMI OUT NRM PARAM NOF/U: HCPCS | Performed by: INTERNAL MEDICINE

## 2022-02-23 PROCEDURE — 3017F COLORECTAL CA SCREEN DOC REV: CPT | Performed by: INTERNAL MEDICINE

## 2022-02-23 PROCEDURE — G8536 NO DOC ELDER MAL SCRN: HCPCS | Performed by: INTERNAL MEDICINE

## 2022-02-23 PROCEDURE — 1101F PT FALLS ASSESS-DOCD LE1/YR: CPT | Performed by: INTERNAL MEDICINE

## 2022-02-23 PROCEDURE — G8427 DOCREV CUR MEDS BY ELIG CLIN: HCPCS | Performed by: INTERNAL MEDICINE

## 2022-02-23 PROCEDURE — G9231 DOC ESRD DIA TRANS PREG: HCPCS | Performed by: INTERNAL MEDICINE

## 2022-02-23 NOTE — PROGRESS NOTES
Ronda Villasenor is a 77 y.o. male and presents with Follow Up Chronic Condition, Hypertension, and Coronary Artery Disease    Mr. Yoav King came for regular follow-up. He is getting chemotherapy with Orest Favors every 3 weeks at [facility] having adenocarcinoma of the lung stage III with the oncologist.  He is getting dialysis on Tuesday Thursday and Saturday having ESRD. His blood pressure is controlled. He has AV fistula in left arm. He is still able to do most of the ADL he gets tired but he told me he has to do and he has to take care of his wife who has a foot drop, when he go for chemotherapy his sister drive otherwise, he drives by himself to go to dialysis center he has no depression he follows pulmonologist Dr. Radames Epstein, follows cardiologist Dr. Yosef Hopson and follows oncologist,. He is compliant for medicines. He did not bring bag of medicines to be reviewed but he told me that he is taking everything the same except getting chemo. Review of Systems    Review of Systems   Constitutional: Negative. HENT: Negative for sinus pain and sore throat. Eyes: Negative for blurred vision. Respiratory: Negative for cough and wheezing. Cardiovascular: Negative. Gastrointestinal: Negative. Genitourinary: Negative. Getting hemodialysis 3 days a week   Musculoskeletal: Negative. Skin:        AV fistula in the left arm   Neurological: Negative for dizziness, tingling, tremors, sensory change and headaches. Psychiatric/Behavioral: Negative.          Past Medical History:   Diagnosis Date    Anemia 10/16/2020    Cardiac pacemaker in situ 10/16/2020    CHF (congestive heart failure) ([facility] 10/16/2020    COPD (chronic obstructive pulmonary disease) ([facility] 10/16/2020    Coronary arteriosclerosis in native artery 10/16/2020    End stage renal failure on dialysis [facility]) 10/16/2020    Essential hypertension 10/16/2020    Excessive salivation 10/16/2020    H/O hernia repair 03/27/2013    Hip pain 10/16/2020    History of lobectomy of lung 10/16/2020    History of lobectomy of lung     Idiopathic pulmonary fibrosis (HCC) 10/16/2020    Olecranon bursitis 10/16/2020    Pleuritic pain 10/16/2020    Sinus node dysfunction (HCC) 10/16/2020     Past Surgical History:   Procedure Laterality Date    HX COLONOSCOPY      HX PACEMAKER      HX PACEMAKER PLACEMENT      VASCULAR SURGERY PROCEDURE UNLIST       Social History     Socioeconomic History    Marital status:    Tobacco Use    Smoking status: Former Smoker    Smokeless tobacco: Never Used    Tobacco comment: QUIT 1970   Vaping Use    Vaping Use: Never used   Substance and Sexual Activity    Alcohol use: Not Currently    Drug use: Never     Family History   Problem Relation Age of Onset    Diabetes Mother      Current Outpatient Medications   Medication Sig Dispense Refill    albuterol (PROVENTIL HFA, VENTOLIN HFA, PROAIR HFA) 90 mcg/actuation inhaler USE 2 INHALATIONS EVERY 4 HOURS AS NEEDED FOR WHEEZING, ASTHMA ATTACK 25.5 g 2    NIFEdipine ER (ADALAT CC) 60 mg ER tablet TAKE 1 TABLET TWICE A DAY FOR HIGH BLOOD PRESSURE 180 Tablet 2    isosorbide mononitrate ER (IMDUR) 30 mg tablet TAKE 1 TABLET EVERY MORNING (PREVENTION OF ANGINAL CHEST PAIN ASSOCIATED WITH CORONARY ARTERY DISEASE) 90 Tablet 3    minoxidiL (LONITEN) 2.5 mg tablet TAKE 1 TABLET EVERY 12 HOURS AS DIRECTED 180 Tablet 2    labetaloL (NORMODYNE) 200 mg tablet TAKE 1 TABLET THREE TIMES A DAY AS DIRECTED 270 Tablet 2    hydrALAZINE (APRESOLINE) 25 mg tablet TAKE 1 TABLET EVERY 8 HOURS AS DIRECTED 270 Tablet 2    cloNIDine HCL (CATAPRES) 0.1 mg tablet TAKE 1 TABLET EVERY 12 HOURS AS DIRECTED 180 Tablet 2    glycopyrrolate (ROBINUL) 1 mg tablet TAKE 1 TABLET TWICE A DAY AS NEEDED 180 Tab 3    calcium acetate,phosphat bind, (PHOSLYRA) 667 mg (169 mg calcium)/5 mL soln ORAL SOLUTION Take 1 Tab by mouth three (3) times daily.       cyanocobalamin 1,000 mcg tablet Take 1 Tab by mouth daily.  aspirin delayed-release 81 mg tablet Take 1 Tab by mouth daily.  multivit-min/FA/lycopen/lutein (CENTRUM SILVER MEN PO) Take 1 Tab by mouth daily.  ergocalciferol, vitamin d2, 10 mcg (400 unit) tab Take 1 Tablet by mouth daily.  acetaminophen (TYLENOL) 325 mg tablet Take 1 Tab by mouth as needed. Allergies   Allergen Reactions    Pcn [Penicillins] Nausea and Vomiting     ALSO CAUSES HA'S       Objective:  Visit Vitals  /74 (BP 1 Location: Right upper arm, BP Patient Position: Sitting, BP Cuff Size: Adult)   Pulse 72   Temp 98.6 °F (37 °C)   Resp 20   Ht 5' 6\" (1.676 m)   Wt 157 lb 3.2 oz (71.3 kg)   SpO2 94%   BMI 25.37 kg/m²       Physical Exam:   Constitutional: General Appearance: Pleasant. Level of Distress: NAD. Psychiatric: Mental Status: normal mood and affect Orientation: to time, place, and person. ,normal eye contact. Head: Head: normocephalic and atraumatic. Eyes: Pupils: PERRLA. Sclerae: non-icteric. Neck: Neck: supple, trachea midline, and no masses. Lymph Nodes: no cervical LAD. Thyroid: no enlargement or nodules and non-tender. Lungs: Respiratory effort: no dyspnea. Auscultation: no wheezing, rales/crackles, or rhonchi and breath sounds normal and good air movement. Cardiovascular: Apical Impulse: not displaced. Heart Auscultation: normal S1 and S2; no murmurs, rubs, or gallops; and RRR. Neck vessels: no carotid bruits. Pulses including femoral / pedal: normal throughout. Abdomen: Bowel Sounds: normal. Inspection and Palpation: no tenderness, guarding, or masses and soft and non-distended. Liver: non-tender and no hepatomegaly. Spleen: non-tender and no splenomegaly. Musculoskeletal[de-identified] Extremities: no edema,no varicosities. No Calf tenderness. Neurologic: Gait and Station: normal gait and station. Motor Strength normal right and left. Skin: Inspection and palpation: no rash, lesions, or ulcer. AV fistula in the left arm.       No results found for this or any previous visit. Assessment/Plan:      ICD-10-CM ICD-9-CM    1. Essential hypertension  I10 401.9    2. End stage renal failure on dialysis (HCC)  N18.6 585.6     Z99.2 V45.11    3. Chronic obstructive pulmonary disease, unspecified COPD type ([facility]  J44.9 496    4. Idiopathic pulmonary fibrosis ([facility]  J84.112 516.31    5. Coronary arteriosclerosis in native artery  I25.10 414.01    6. Sinus node dysfunction (HCC)  I49.5 427.81    7. Adenocarcinoma of left lung (HCC)  C34.92 162.9    8. History of lobectomy of lung  Z90.2 V12.59    9. Cardiac pacemaker in situ  Z95.0 V45.01    10. History of lung cancer  Z85. 118 V10.11      No orders of the defined types were placed in this encounter. Hypertension with ESRD on hemodialysis. Controlled in right upper arm. .  Continued on clonidine 0.1 mg twice a day, hydralazine 25 mg 8 hourly, labetalol 200 mg 3 times a day, minoxidil 2.5 mg twice a day and nifedipine ER 60 mg twice a day. ESRD on hemodialysis getting on Tuesday Thursday and Saturday. History of coronary artery disease under the care of cardiologist Dr. Ac Birmingham recently he underwent echo I do not have results we will try to get it from his office, according to him Dr. Angel Waggoner told that everything is good. Adenocarcinoma of the left lung stage III and immunocompromise getting Keytruda every 3 weekly I try to get in care everywhere and read the summary that I have I have no consult notes that I can see in [facility]. He had history of lobectomy of the lung in the past.    No depression. Able to do most of the ADLs by himself he is not driving far. He has taken COVID vaccine. I offered him Prevnar 13  He does do not want to take today he told me he will hold for now. Follow-up in 6 months. Gave the wrong information by misunderstanding regarding certain medicines that he is not taking but later on he told me he is taking everything.   I told him to bring medicines in each visit to reconcile      continue present plan, call if any problems    There are no Patient Instructions on file for this visit. Follow-up and Dispositions    · Return in about 6 months (around 8/23/2022) for follow up for chronic condition.

## 2022-02-23 NOTE — PROGRESS NOTES
1. Have you been to the ER, urgent care clinic since your last visit? Hospitalized since your last visit? No    2. Have you seen or consulted any other health care providers outside of the [address] [place] [address] since your last visit? Include any pap smears or colon screening.  No     Visit Vitals  /70 (BP 1 Location: Left arm)   Pulse 87   Temp 98.6 °F (37 °C)   Resp 20   Ht 5' 6\" (1.676 m)   Wt 157 lb 3.2 oz (71.3 kg)   SpO2 94%   BMI 25.37 kg/m²       Chief Complaint   Patient presents with    Follow Up Chronic Condition    Hypertension    Coronary Artery Disease

## 2022-03-18 PROBLEM — M25.559 HIP PAIN: Status: ACTIVE | Noted: 2020-10-16

## 2022-03-18 PROBLEM — J84.112 IDIOPATHIC PULMONARY FIBROSIS (HCC): Status: ACTIVE | Noted: 2020-10-16

## 2022-03-18 PROBLEM — Z85.118 HISTORY OF LUNG CANCER: Status: ACTIVE | Noted: 2020-10-19

## 2022-03-18 PROBLEM — M70.20 OLECRANON BURSITIS: Status: ACTIVE | Noted: 2020-10-16

## 2022-03-19 PROBLEM — N18.6 END STAGE RENAL FAILURE ON DIALYSIS (HCC): Status: ACTIVE | Noted: 2020-10-16

## 2022-03-19 PROBLEM — Z99.2 END STAGE RENAL FAILURE ON DIALYSIS (HCC): Status: ACTIVE | Noted: 2020-10-16

## 2022-03-19 PROBLEM — J44.9 COPD (CHRONIC OBSTRUCTIVE PULMONARY DISEASE) (HCC): Status: ACTIVE | Noted: 2020-10-16

## 2022-03-19 PROBLEM — Z90.2 HISTORY OF LOBECTOMY OF LUNG: Status: ACTIVE | Noted: 2020-10-16

## 2022-03-19 PROBLEM — R07.81 PLEURITIC PAIN: Status: ACTIVE | Noted: 2020-10-16

## 2022-03-19 PROBLEM — Z95.0 CARDIAC PACEMAKER IN SITU: Status: ACTIVE | Noted: 2020-10-16

## 2022-03-19 PROBLEM — I10 ESSENTIAL HYPERTENSION: Status: ACTIVE | Noted: 2020-10-16

## 2022-03-19 PROBLEM — I25.10 CORONARY ARTERIOSCLEROSIS IN NATIVE ARTERY: Status: ACTIVE | Noted: 2020-10-16

## 2022-03-20 PROBLEM — K11.7 EXCESSIVE SALIVATION: Status: ACTIVE | Noted: 2020-10-16

## 2022-03-20 PROBLEM — I49.5 SINUS NODE DYSFUNCTION (HCC): Status: ACTIVE | Noted: 2020-10-16

## 2022-03-20 PROBLEM — D64.9 ANEMIA: Status: ACTIVE | Noted: 2020-10-16

## 2022-06-27 ENCOUNTER — TELEPHONE (OUTPATIENT)
Dept: INTERNAL MEDICINE CLINIC | Age: 77
End: 2022-06-27

## 2022-06-27 NOTE — TELEPHONE ENCOUNTER
The patient came in and he has a large fluid filled blister. He states that he does not know how he go the blisteron his right arm. I did advise the patient to go to urgent care to be seen and I also advised the patient not to pop the blister.

## 2022-07-04 ENCOUNTER — APPOINTMENT (OUTPATIENT)
Dept: CT IMAGING | Age: 77
End: 2022-07-04
Attending: STUDENT IN AN ORGANIZED HEALTH CARE EDUCATION/TRAINING PROGRAM
Payer: MEDICARE

## 2022-07-04 ENCOUNTER — HOSPITAL ENCOUNTER (EMERGENCY)
Age: 77
Discharge: HOME | End: 2022-07-04
Attending: STUDENT IN AN ORGANIZED HEALTH CARE EDUCATION/TRAINING PROGRAM
Payer: MEDICARE

## 2022-07-04 VITALS
HEIGHT: 66 IN | TEMPERATURE: 98 F | SYSTOLIC BLOOD PRESSURE: 195 MMHG | WEIGHT: 157 LBS | HEART RATE: 61 BPM | RESPIRATION RATE: 18 BRPM | BODY MASS INDEX: 25.23 KG/M2 | OXYGEN SATURATION: 100 % | DIASTOLIC BLOOD PRESSURE: 83 MMHG

## 2022-07-04 DIAGNOSIS — S09.90XA TRAUMATIC INJURY OF HEAD, INITIAL ENCOUNTER: Primary | ICD-10-CM

## 2022-07-04 PROCEDURE — 74011250636 HC RX REV CODE- 250/636: Performed by: STUDENT IN AN ORGANIZED HEALTH CARE EDUCATION/TRAINING PROGRAM

## 2022-07-04 PROCEDURE — 99284 EMERGENCY DEPT VISIT MOD MDM: CPT

## 2022-07-04 PROCEDURE — 90715 TDAP VACCINE 7 YRS/> IM: CPT | Performed by: STUDENT IN AN ORGANIZED HEALTH CARE EDUCATION/TRAINING PROGRAM

## 2022-07-04 PROCEDURE — 72125 CT NECK SPINE W/O DYE: CPT

## 2022-07-04 PROCEDURE — 70450 CT HEAD/BRAIN W/O DYE: CPT

## 2022-07-04 PROCEDURE — 90471 IMMUNIZATION ADMIN: CPT

## 2022-07-04 RX ORDER — BACITRACIN ZINC 500 [USP'U]/G
1 CREAM TOPICAL 3 TIMES DAILY
Status: DISCONTINUED | OUTPATIENT
Start: 2022-07-04 | End: 2022-07-04 | Stop reason: HOSPADM

## 2022-07-04 RX ADMIN — TETANUS TOXOID, REDUCED DIPHTHERIA TOXOID AND ACELLULAR PERTUSSIS VACCINE, ADSORBED 0.5 ML: 5; 2.5; 8; 8; 2.5 SUSPENSION INTRAMUSCULAR at 06:43

## 2022-07-04 NOTE — ED PROVIDER NOTES
[address] [id number]  EMERGENCY DEPARTMENT ENCOUNTER NOTE    Date: 7/4/2022  Patient Name: Giacomo Sloan    History of Presenting Illness     Chief Complaint   Patient presents with    Fall    Head Injury    Neck Pain     HPI: Giacomo Sloan, 77 y.o. male with a past medical history and outpatient medications as listed and reviewed below  presents for head trauma. Event description: Patient was sitting at the side of the bed when he fell asleep and fell forwards hitting his frontal aspect of the scalp against the floor. He had some skin avulsion of the anterior scalp but no lacerations. No loss of consciousness. No nausea or vomiting. Current injuries: skin avulsion. Patient denies significant head trauma red flag symptoms including any LOC, vomiting, AMS, amnesia, seizures, blood thinner use, or signs of skull fracture. Otherwise, there is no noted chest pain, SOB, abdominal pain, significant bleeding, or other complaints.     Medical History   I reviewed the medical, surgical, family, and social history, as well as allergies:    PCP: Thelma Esteves MD    Past Medical History:  Past Medical History:   Diagnosis Date    Anemia 10/16/2020    Cardiac pacemaker in situ 10/16/2020    CHF (congestive heart failure) ([facility] 10/16/2020    COPD (chronic obstructive pulmonary disease) ([address] 10/16/2020    Coronary arteriosclerosis in native artery 10/16/2020    End stage renal failure on dialysis [facility]) 10/16/2020    Essential hypertension 10/16/2020    Excessive salivation 10/16/2020    H/O hernia repair 03/27/2013    Hip pain 10/16/2020    History of lobectomy of lung 10/16/2020    History of lobectomy of lung     Idiopathic pulmonary fibrosis ([address] 10/16/2020    Olecranon bursitis 10/16/2020    Pleuritic pain 10/16/2020    Sinus node dysfunction ([address] 10/16/2020     Past Surgical History:  Past Surgical History:   Procedure Laterality Date    HX COLONOSCOPY  HX PACEMAKER      HX PACEMAKER PLACEMENT      VASCULAR SURGERY PROCEDURE UNLIST       Current Outpatient Medications:  Current Outpatient Medications   Medication Instructions    acetaminophen (TYLENOL) 325 mg tablet 1 Tablet, Oral, AS NEEDED    albuterol (PROVENTIL HFA, VENTOLIN HFA, PROAIR HFA) 90 mcg/actuation inhaler USE 2 INHALATIONS EVERY 4 HOURS AS NEEDED FOR WHEEZING, ASTHMA ATTACK    aspirin delayed-release 81 mg tablet 1 Tablet, Oral, DAILY    calcium acetate,phosphat bind, (PHOSLYRA) 667 mg (169 mg calcium)/5 mL soln ORAL SOLUTION 1 Tablet, Oral, 3 TIMES DAILY    cloNIDine HCL (CATAPRES) 0.1 mg tablet TAKE 1 TABLET EVERY 12 HOURS AS DIRECTED    cyanocobalamin 1,000 mcg tablet 1 Tablet, Oral, DAILY    ergocalciferol, vitamin d2, 10 mcg (400 unit) tab 1 Tablet, Oral, DAILY    glycopyrrolate (ROBINUL) 1 mg tablet TAKE 1 TABLET TWICE A DAY AS NEEDED    hydrALAZINE (APRESOLINE) 25 mg tablet TAKE 1 TABLET EVERY 8 HOURS AS DIRECTED    isosorbide mononitrate ER (IMDUR) 30 mg tablet TAKE 1 TABLET EVERY MORNING (PREVENTION OF ANGINAL CHEST PAIN ASSOCIATED WITH CORONARY ARTERY DISEASE)    labetaloL (NORMODYNE) 200 mg tablet TAKE 1 TABLET THREE TIMES A DAY AS DIRECTED    minoxidiL (LONITEN) 2.5 mg tablet TAKE 1 TABLET EVERY 12 HOURS AS DIRECTED    multivit-min/FA/lycopen/lutein (CENTRUM SILVER MEN PO) 1 Tablet, Oral, DAILY    NIFEdipine ER (ADALAT CC) 60 mg ER tablet TAKE 1 TABLET TWICE A DAY FOR HIGH BLOOD PRESSURE      Family History:  Family History   Problem Relation Age of Onset    Diabetes Mother      Social History:  Social History     Tobacco Use    Smoking status: Former Smoker    Smokeless tobacco: Never Used    Tobacco comment: QUIT 1970   Vaping Use    Vaping Use: Never used   Substance Use Topics    Alcohol use: Not Currently    Drug use: Never     Allergies:   Allergies   Allergen Reactions    Pcn [Penicillins] Nausea and Vomiting     ALSO CAUSES HA'S       Review of Systems All other systems negative. Physical Exam and Vital Signs   Vital Signs - Reviewed the patient's vital signs. Patient Vitals for the past 12 hrs:   Temp Pulse Resp BP SpO2   07/04/22 0601 98 °F (36.7 °C) 61 18 (!) 195/83 100 %     Physical Exam:    GENERAL: not in apparent distress  HEENT:  * EOMI  * Head exam shows skin avulsion over the frontal aspect of the scalp. No lacerations. No bruising. No depressed skull fractures. CV:  * warm extremities  * no cyanosis  PULMONARY: no respiratory distress, non labored breathing, no audible wheezing or stridor, no accessory muscle use  ABDOMEN: soft, moving in bed and pulls to seated position without grimace or pain  EXTREMITIES/BACK: moving four extremities without limitation  SKIN: no other signs of trauma  NEURO:  * Speech clear  * GCS 15    Medical Decision Making   - I am the first and primary provider for this patient and am the primary provider of record. - I reviewed the vital signs, available nursing notes, past medical history, past surgical history, family history and social history. - Initial assessment performed. The patients presenting problems have been discussed, and the staff are in agreement with the care plan formulated and outlined with them. I have encouraged them to ask questions as they arise throughout their visit. - Available medical records, nursing notes, old EKGs, and EMS run sheets (if patient was EMS transported) were reviewed    MDM:   Patient is a 77 y.o. male presenting for head trauma. Vitals reveal HTN and physical exam reveals skin avulsion. Based on the history, physical exam, risk factors, and vital signs, differential includes: soft tissue contusion, abrasion, concussion. This presentation is not worrisome for I[facility] as the patient has normal mentation, no red flags on history or physical examination, and normal vital signs.  Since the patient has had normal mentation throughout the ED stay, no vomiting, no loss of consciousness, and no further complaints with normal vital signs, the patient is cleared to be discharged home. Laceration was not present for further management. Results     Labs:  No results found for this or any previous visit (from the past 12 hour(s)). Radiologic Studies:  CT Results  (Last 48 hours)               07/04/22 0717  CT HEAD WO CONT Final result    Impression:      No acute traumatic injury. Narrative:  INDICATION:   fall, head trauma       EXAMINATION:  CT HEAD WO CONTRAST       COMPARISON:  None       TECHNIQUE:  Routine noncontrast axial head CT was performed. Sagittal and   coronal reconstructions were generated. CT dose reduction was achieved through   use of a standardized protocol tailored for this examination and automatic   exposure control for dose modulation. FINDINGS:       Ventricles:  Midline, no hydrocephalus. Intracranial Hemorrhage:  None. Brain Parenchyma/Brainstem:  Periventricular white matter hypodensity, and   chronic right occipital infarction. Basal Cisterns:  Normal.    Paranasal Sinuses:  Visualized sinuses are clear. Soft Tissues:  No significant soft tissue swelling. Osseous Structures:  No acute fracture. Additional Comments:  N/A.            07/04/22 0717  CT SPINE CERV WO CONT Final result    Impression:      No acute fracture. Narrative:  INDICATION: fall, headtrauma, neck pain       COMPARISON: None. TECHNIQUE:   Noncontrast axial CT imaging of the cervical spine was performed. Coronal and sagittal reconstructions were obtained. CT dose reduction was achieved through use of a standardized protocol tailored   for this examination and automatic exposure control for dose modulation. FINDINGS:       There is normal alignment of the cervical spine. There is no acute fracture or   subluxation. The craniocervical junction is intact. There is no prevertebral   soft tissue swelling.  Multilevel degenerative changes in mid and lower cervical   spine. Biapical emphysema/scarring. .               CXR Results  (Last 48 hours)    None        Medications ordered:  Medications   diph,Pertuss(AC),Tet Vac-PF (BOOSTRIX) suspension 0.5 mL (0.5 mL IntraMUSCular Given 7/4/22 0643)     ED Course and Reassessment     ED Course:     ED Course as of 07/04/22 0728 Mon Jul 04, 2022 0728 The non-contrasted head CT was negative for acute process making acute intracranial bleed unlikely. No evidence of evolving large subacute strokes, ventriculomegaly, or masses. CT C-spine did not show any evidence of acute bone abnormalities. [SS]      ED Course User Index  [SS] Hemalatha Dukes MD       Reassessment:    Patient has had normal mentation throughout the ED stay. No nausea or loss of consciousness during stay. No further complaints or abnormalities. Vital signs have been reassuring. The patient is cleared to be discharged home. Understanding was insured that at this time there is no evidence for a more malignant underlying process, but that early in the process of an illness, an emergency department workup can be falsely reassuring. Routine discharge counseling was given including the fact that any worsening, changing or persistent symptoms should prompt an immediate call or follow up with their primary physician or the emergency department. Specifically, symptoms that were stressed to watch out for include any altered mentation, lethargy, vomiting, headaches, or any other worrying or new symptoms. The importance of appropriate follow up was also discussed. More extensive discharge instructions were given in the patient's discharge paperwork. Final Disposition     DISCHARGED FROM EMERGENCY DEPARTMENT    Patient will be discharged from the Emergency Department in stable condition. All of the diagnostic tests were reviewed and any questions were answered.  Diagnosis, results, follow up if applicable, and return precautions were discussed. I have also put together printed discharge instructions for them that include: 1) educational information regarding their diagnosis, 2) how to care for their diagnosis at home, as well a 3) list of reasons why they would want to return to the ED prior to their follow-up appointment, should their condition change. Any labs or imaging done in the ED will be either printed with the discharge paperwork or available through [address] DISCHARGE PLAN:  1. Current Discharge Medication List      CONTINUE these medications which have NOT CHANGED    Details   albuterol (PROVENTIL HFA, VENTOLIN HFA, PROAIR HFA) 90 mcg/actuation inhaler USE 2 INHALATIONS EVERY 4 HOURS AS NEEDED FOR WHEEZING, ASTHMA ATTACK  Qty: 25.5 g, Refills: 2      NIFEdipine ER (ADALAT CC) 60 mg ER tablet TAKE 1 TABLET TWICE A DAY FOR HIGH BLOOD PRESSURE  Qty: 180 Tablet, Refills: 2      isosorbide mononitrate ER (IMDUR) 30 mg tablet TAKE 1 TABLET EVERY MORNING (PREVENTION OF ANGINAL CHEST PAIN ASSOCIATED WITH CORONARY ARTERY DISEASE)  Qty: 90 Tablet, Refills: 3      minoxidiL (LONITEN) 2.5 mg tablet TAKE 1 TABLET EVERY 12 HOURS AS DIRECTED  Qty: 180 Tablet, Refills: 2      labetaloL (NORMODYNE) 200 mg tablet TAKE 1 TABLET THREE TIMES A DAY AS DIRECTED  Qty: 270 Tablet, Refills: 2      hydrALAZINE (APRESOLINE) 25 mg tablet TAKE 1 TABLET EVERY 8 HOURS AS DIRECTED  Qty: 270 Tablet, Refills: 2      cloNIDine HCL (CATAPRES) 0.1 mg tablet TAKE 1 TABLET EVERY 12 HOURS AS DIRECTED  Qty: 180 Tablet, Refills: 2      glycopyrrolate (ROBINUL) 1 mg tablet TAKE 1 TABLET TWICE A DAY AS NEEDED  Qty: 180 Tab, Refills: 3      calcium acetate,phosphat bind, (PHOSLYRA) 667 mg (169 mg calcium)/5 mL soln ORAL SOLUTION Take 1 Tab by mouth three (3) times daily. cyanocobalamin 1,000 mcg tablet Take 1 Tab by mouth daily. aspirin delayed-release 81 mg tablet Take 1 Tab by mouth daily.       multivit-min/FA/lycopen/lutein (CENTRUM SILVER MEN PO) Take 1 Tab by mouth daily. ergocalciferol, vitamin d2, 10 mcg (400 unit) tab Take 1 Tablet by mouth daily. acetaminophen (TYLENOL) 325 mg tablet Take 1 Tab by mouth as needed. 2.   Follow-up Information     Follow up With Specialties Details Why [facility] EMERGENCY DEPT Emergency Medicine Go to  If symptoms worsen [address] [id number] [phone]        3. Return to ED if worse    4. Current Discharge Medication List          Diagnosis     Clinical Impression:   1. Traumatic injury of head, initial encounter      Attestations:    Jack Goins MD    Please note that this dictation was completed with [employer], the computer voice recognition software. Quite often unanticipated grammatical, syntax, homophones, and other interpretive errors are inadvertently transcribed by the computer software. Please disregard these errors. Please excuse any errors that have escaped final proofreading. Thank you.

## 2022-07-04 NOTE — ED TRIAGE NOTES
Was sitting on side of bed and fell asleep and fell forward striking head. Abrasion noted to head. Pt also c/o rear neck pain. unk loc.

## 2022-07-04 NOTE — DISCHARGE INSTRUCTIONS
Thank you! Thank you for allowing me to care for you in the emergency department. I sincerely hope that you are satisfied with your visit today. It is my goal to provide you with excellent care. Below you will find a list of your labs and imaging from your visit today if applicable. Should you have any questions regarding these results please do not hesitate to call the emergency department. Please review [employer] [facility] for a more detailed result list since the below list may not be comprehensive. Instructions on how to sign up to [employer] should be provided in this packet. Labs -   No results found for this or any previous visit (from the past 12 hour(s)). Radiologic Studies -   CT HEAD WO CONT   Final Result      No acute traumatic injury. CT SPINE CERV WO CONT   Final Result      No acute fracture. CT Results  (Last 48 hours)                 07/04/22 0717  CT HEAD WO CONT Final result    Impression:      No acute traumatic injury. Narrative:  INDICATION:   fall, head trauma       EXAMINATION:  CT HEAD WO CONTRAST       COMPARISON:  None       TECHNIQUE:  Routine noncontrast axial head CT was performed. Sagittal and   coronal reconstructions were generated. CT dose reduction was achieved through   use of a standardized protocol tailored for this examination and automatic   exposure control for dose modulation. FINDINGS:       Ventricles:  Midline, no hydrocephalus. Intracranial Hemorrhage:  None. Brain Parenchyma/Brainstem:  Periventricular white matter hypodensity, and   chronic right occipital infarction. Basal Cisterns:  Normal.    Paranasal Sinuses:  Visualized sinuses are clear. Soft Tissues:  No significant soft tissue swelling. Osseous Structures:  No acute fracture. Additional Comments:  N/A.            07/04/22 0717  CT SPINE CERV WO CONT Final result    Impression:      No acute fracture.        Narrative:  INDICATION: fall, headtrauma, neck pain COMPARISON: None. TECHNIQUE:   Noncontrast axial CT imaging of the cervical spine was performed. Coronal and sagittal reconstructions were obtained. CT dose reduction was achieved through use of a standardized protocol tailored   for this examination and automatic exposure control for dose modulation. FINDINGS:       There is normal alignment of the cervical spine. There is no acute fracture or   subluxation. The craniocervical junction is intact. There is no prevertebral   soft tissue swelling. Multilevel degenerative changes in mid and lower cervical   spine. Biapical emphysema/scarring. .                 CXR Results  (Last 48 hours)      None               If you feel that you have not received excellent quality care or timely care, please ask to speak to the nurse manager. Please choose us in the future for your continued health care needs. ------------------------------------------------------------------------------------------------------------  The exam and treatment you received in the Emergency Department were for an urgent problem and are not intended as complete care. It is important that you follow-up with a doctor, nurse practitioner, or physician assistant to:  (1) confirm your diagnosis,  (2) re-evaluation of changes in your illness and treatment, and  (3) for ongoing care. If your symptoms become worse or you do not improve as expected and you are unable to reach your usual health care provider, you should return to the Emergency Department. We are available 24 hours a day. Please take your discharge instructions with you when you go to your follow-up appointment. If a prescription has been provided, please have it filled as soon as possible to prevent a delay in treatment. Read the entire medication instruction sheet provided to you by the pharmacy.  If you have any questions or reservations about taking the medication due to side effects or interactions with other medications, please call your primary care physician or contact the ER to speak with the charge nurse. Make an appointment with your family doctor or the physician you were referred to for follow-up of this visit as instructed on your discharge paperwork, as this is a mandatory follow-up. Return to the ER if you are unable to be seen or if you are unable to be seen in a timely manner. If you have any problem arranging the follow-up visit, contact the Emergency Department immediately.

## 2022-08-13 ENCOUNTER — HOSPITAL ENCOUNTER (EMERGENCY)
Age: 77
Discharge: HOME | End: 2022-08-13
Attending: STUDENT IN AN ORGANIZED HEALTH CARE EDUCATION/TRAINING PROGRAM
Payer: MEDICARE

## 2022-08-13 VITALS
HEART RATE: 73 BPM | WEIGHT: 156 LBS | TEMPERATURE: 98.1 F | OXYGEN SATURATION: 98 % | RESPIRATION RATE: 18 BRPM | DIASTOLIC BLOOD PRESSURE: 63 MMHG | HEIGHT: 66 IN | BODY MASS INDEX: 25.07 KG/M2 | SYSTOLIC BLOOD PRESSURE: 143 MMHG

## 2022-08-13 DIAGNOSIS — L12.9 PEMPHIGOID: Primary | ICD-10-CM

## 2022-08-13 LAB
ALBUMIN SERPL-MCNC: 3 G/DL (ref 3.5–5)
ALBUMIN/GLOB SERPL: 0.7 {RATIO} (ref 1.1–2.2)
ALP SERPL-CCNC: 102 U/L (ref 45–117)
ALT SERPL-CCNC: 20 U/L (ref 12–78)
ANION GAP SERPL CALC-SCNC: 5 MMOL/L (ref 5–15)
AST SERPL W P-5'-P-CCNC: 28 U/L (ref 15–37)
BASOPHILS # BLD: 0 K/UL (ref 0–0.1)
BASOPHILS NFR BLD: 1 % (ref 0–1)
BILIRUB SERPL-MCNC: 0.5 MG/DL (ref 0.2–1)
BUN SERPL-MCNC: 26 MG/DL (ref 6–20)
BUN/CREAT SERPL: 4 (ref 12–20)
CA-I BLD-MCNC: 8.7 MG/DL (ref 8.5–10.1)
CHLORIDE SERPL-SCNC: 98 MMOL/L (ref 97–108)
CO2 SERPL-SCNC: 29 MMOL/L (ref 21–32)
CREAT SERPL-MCNC: 6.44 MG/DL (ref 0.7–1.3)
DIFFERENTIAL METHOD BLD: ABNORMAL
EOSINOPHIL # BLD: 0.9 K/UL (ref 0–0.4)
EOSINOPHIL NFR BLD: 11 % (ref 0–7)
ERYTHROCYTE [DISTWIDTH] IN BLOOD BY AUTOMATED COUNT: 14.2 % (ref 11.5–14.5)
GLOBULIN SER CALC-MCNC: 4.5 G/DL (ref 2–4)
GLUCOSE SERPL-MCNC: 111 MG/DL (ref 65–100)
HCT VFR BLD AUTO: 36.5 % (ref 36.6–50.3)
HGB BLD-MCNC: 12.1 G/DL (ref 12.1–17)
IMM GRANULOCYTES # BLD AUTO: 0 K/UL (ref 0–0.04)
IMM GRANULOCYTES NFR BLD AUTO: 0 % (ref 0–0.5)
LYMPHOCYTES # BLD: 0.7 K/UL (ref 0.8–3.5)
LYMPHOCYTES NFR BLD: 8 % (ref 12–49)
MCH RBC QN AUTO: 33 PG (ref 26–34)
MCHC RBC AUTO-ENTMCNC: 33.2 G/DL (ref 30–36.5)
MCV RBC AUTO: 99.5 FL (ref 80–99)
MONOCYTES # BLD: 0.9 K/UL (ref 0–1)
MONOCYTES NFR BLD: 12 % (ref 5–13)
NEUTS SEG # BLD: 5.6 K/UL (ref 1.8–8)
NEUTS SEG NFR BLD: 68 % (ref 32–75)
NRBC # BLD: 0 K/UL (ref 0–0.01)
NRBC BLD-RTO: 0 PER 100 WBC
PLATELET # BLD AUTO: 202 K/UL (ref 150–400)
PMV BLD AUTO: 9.9 FL (ref 8.9–12.9)
POTASSIUM SERPL-SCNC: 4.7 MMOL/L (ref 3.5–5.1)
PROT SERPL-MCNC: 7.5 G/DL (ref 6.4–8.2)
RBC # BLD AUTO: 3.67 M/UL (ref 4.1–5.7)
SODIUM SERPL-SCNC: 132 MMOL/L (ref 136–145)
WBC # BLD AUTO: 8.1 K/UL (ref 4.1–11.1)

## 2022-08-13 PROCEDURE — 80053 COMPREHEN METABOLIC PANEL: CPT

## 2022-08-13 PROCEDURE — 36415 COLL VENOUS BLD VENIPUNCTURE: CPT

## 2022-08-13 PROCEDURE — 85025 COMPLETE CBC W/AUTO DIFF WBC: CPT

## 2022-08-13 PROCEDURE — 99283 EMERGENCY DEPT VISIT LOW MDM: CPT

## 2022-08-13 RX ORDER — PREDNISONE 10 MG/1
TABLET ORAL
Qty: 21 TABLET | Refills: 0 | Status: SHIPPED | OUTPATIENT
Start: 2022-08-13 | End: 2022-08-24 | Stop reason: ALTCHOICE

## 2022-08-13 RX ORDER — PREDNISONE 10 MG/1
TABLET ORAL
Qty: 21 TABLET | Refills: 0 | Status: SHIPPED | OUTPATIENT
Start: 2022-08-13 | End: 2022-08-13

## 2022-08-13 RX ORDER — DOXYCYCLINE HYCLATE 100 MG
100 TABLET ORAL 2 TIMES DAILY
Qty: 14 TABLET | Refills: 0 | Status: SHIPPED | OUTPATIENT
Start: 2022-08-13 | End: 2022-08-13

## 2022-08-13 RX ORDER — DOXYCYCLINE HYCLATE 100 MG
100 TABLET ORAL 2 TIMES DAILY
Qty: 14 TABLET | Refills: 0 | Status: SHIPPED | OUTPATIENT
Start: 2022-08-13 | End: 2022-08-24 | Stop reason: ALTCHOICE

## 2022-08-13 NOTE — ED TRIAGE NOTES
Pt presents with lesions on arms, legs, and back x2wk. Thinks its from his chemo therapy, spoke to his oncologist that wanted him to see a dermatologist but he hasn't heard from anyone yet.

## 2022-08-14 NOTE — ED PROVIDER NOTES
EMERGENCY DEPARTMENT HISTORY AND PHYSICAL EXAM      Date: 8/13/2022  Patient Name: Omar Corley    History of Presenting Illness     Chief Complaint   Patient presents with    Skin Problem       History Provided By: Patient    HPI: Omar Corley, 69 y.o. male with a past medical history significant for lung cancer presents to the ED with cc of rash to body, blistering. Patient states he was recently started on a new chemotherapeutic agent, received approximately 3 weeks ago states about 2 weeks ago noticed blistering occurring on his head, blisters sloughed and proceeded to have ulceration, noted similar changes occurring all over his body with blistering which then sloughed into ulcers. Patient denies any significant pain to area however states that area to itch. Denies any fevers chills, no drainage, no intraoral or ocular involvement. There are no other complaints, changes, or physical findings at this time. PCP: Augusta Camarillo MD    No current facility-administered medications on file prior to encounter.      Current Outpatient Medications on File Prior to Encounter   Medication Sig Dispense Refill    albuterol (PROVENTIL HFA, VENTOLIN HFA, PROAIR HFA) 90 mcg/actuation inhaler USE 2 INHALATIONS EVERY 4 HOURS AS NEEDED FOR WHEEZING, ASTHMA ATTACK 25.5 g 2    NIFEdipine ER (ADALAT CC) 60 mg ER tablet TAKE 1 TABLET TWICE A DAY FOR HIGH BLOOD PRESSURE 180 Tablet 2    isosorbide mononitrate ER (IMDUR) 30 mg tablet TAKE 1 TABLET EVERY MORNING (PREVENTION OF ANGINAL CHEST PAIN ASSOCIATED WITH CORONARY ARTERY DISEASE) 90 Tablet 3    minoxidiL (LONITEN) 2.5 mg tablet TAKE 1 TABLET EVERY 12 HOURS AS DIRECTED 180 Tablet 2    labetaloL (NORMODYNE) 200 mg tablet TAKE 1 TABLET THREE TIMES A DAY AS DIRECTED 270 Tablet 2    hydrALAZINE (APRESOLINE) 25 mg tablet TAKE 1 TABLET EVERY 8 HOURS AS DIRECTED 270 Tablet 2    cloNIDine HCL (CATAPRES) 0.1 mg tablet TAKE 1 TABLET EVERY 12 HOURS AS DIRECTED 180 Tablet 2 glycopyrrolate (ROBINUL) 1 mg tablet TAKE 1 TABLET TWICE A DAY AS NEEDED 180 Tab 3    calcium acetate,phosphat bind, (PHOSLYRA) 667 mg (169 mg calcium)/5 mL soln ORAL SOLUTION Take 1 Tab by mouth three (3) times daily. cyanocobalamin 1,000 mcg tablet Take 1 Tab by mouth daily. aspirin delayed-release 81 mg tablet Take 1 Tab by mouth daily. multivit-min/FA/lycopen/lutein (CENTRUM SILVER MEN PO) Take 1 Tab by mouth daily. ergocalciferol, vitamin d2, 10 mcg (400 unit) tab Take 1 Tablet by mouth daily. acetaminophen (TYLENOL) 325 mg tablet Take 1 Tab by mouth as needed. Past History     Past Medical History:  Past Medical History:   Diagnosis Date    Anemia 10/16/2020    Cardiac pacemaker in situ 10/16/2020    CHF (congestive heart failure) ([facility] 10/16/2020    COPD (chronic obstructive pulmonary disease) ([facility] 10/16/2020    Coronary arteriosclerosis in native artery 10/16/2020    End stage renal failure on dialysis ([facility] 10/16/2020    Essential hypertension 10/16/2020    Excessive salivation 10/16/2020    H/O hernia repair 03/27/2013    Hip pain 10/16/2020    History of lobectomy of lung 10/16/2020    History of lobectomy of lung     Idiopathic pulmonary fibrosis ([facility] 10/16/2020    Olecranon bursitis 10/16/2020    Pleuritic pain 10/16/2020    Sinus node dysfunction ([facility] 10/16/2020       Past Surgical History:  Past Surgical History:   Procedure Laterality Date    HX COLONOSCOPY      HX PACEMAKER      HX PACEMAKER PLACEMENT      VASCULAR SURGERY PROCEDURE UNLIST         Family History:  Family History   Problem Relation Age of Onset    Diabetes Mother        Social History:  Social History     Tobacco Use    Smoking status: Former    Smokeless tobacco: Never    Tobacco comments:     QUIT 1970   Vaping Use    Vaping Use: Never used   Substance Use Topics    Alcohol use: Not Currently    Drug use: Never       Allergies:   Allergies   Allergen Reactions    Pcn [Penicillins] Nausea and Vomiting     ALSO CAUSES HA'S         Review of Systems     Review of Systems   Constitutional:  Negative for activity change, appetite change, chills and fever. HENT:  Negative for congestion, sore throat and trouble swallowing. Eyes:  Negative for photophobia and visual disturbance. Respiratory:  Negative for cough, chest tightness and shortness of breath. Cardiovascular:  Negative for chest pain and palpitations. Gastrointestinal:  Negative for abdominal pain and nausea. Genitourinary:  Negative for dysuria and flank pain. Musculoskeletal:  Negative for arthralgias and neck pain. Skin:  Positive for rash. Negative for color change and pallor. Neurological:  Negative for dizziness, weakness, numbness and headaches. Physical Exam     Physical Exam  Vitals and nursing note reviewed. Constitutional:       Appearance: Normal appearance. He is normal weight. HENT:      Head: Normocephalic and atraumatic. Nose: Nose normal.      Mouth/Throat:      Mouth: Mucous membranes are moist. No oral lesions. Pharynx: Oropharynx is clear. Eyes:      Extraocular Movements: Extraocular movements intact. Pupils: Pupils are equal, round, and reactive to light. Cardiovascular:      Rate and Rhythm: Normal rate and regular rhythm. Pulses: Normal pulses. Heart sounds: Normal heart sounds. Pulmonary:      Breath sounds: Normal breath sounds. Abdominal:      General: Abdomen is flat. Bowel sounds are normal.      Palpations: Abdomen is soft. Musculoskeletal:         General: No swelling or tenderness. Normal range of motion. Cervical back: Normal range of motion and neck supple. Skin:     General: Skin is warm and dry. Capillary Refill: Capillary refill takes less than 2 seconds. Findings: Erythema and lesion present. Neurological:      General: No focal deficit present. Mental Status: He is alert and oriented to person, place, and time. Cranial Nerves: No cranial nerve deficit. Sensory: No sensory deficit. Motor: No weakness. Psychiatric:         Mood and Affect: Mood normal.         Behavior: Behavior normal.       Diagnostic Study Results     Labs -     Recent Results (from the past 12 hour(s))   CBC WITH AUTOMATED DIFF    Collection Time: 08/13/22  8:44 PM   Result Value Ref Range    WBC 8.1 4.1 - 11.1 K/uL    RBC 3.67 (L) 4.10 - 5.70 M/uL    HGB 12.1 12.1 - 17.0 g/dL    HCT 36.5 (L) 36.6 - 50.3 %    MCV 99.5 (H) 80.0 - 99.0 FL    MCH 33.0 26.0 - 34.0 PG    MCHC 33.2 30.0 - 36.5 g/dL    RDW 14.2 11.5 - 14.5 %    PLATELET 447 851 - 867 K/uL    MPV 9.9 8.9 - 12.9 FL    NRBC 0.0 0.0  WBC    ABSOLUTE NRBC 0.00 0.00 - 0.01 K/uL    NEUTROPHILS 68 32 - 75 %    LYMPHOCYTES 8 (L) 12 - 49 %    MONOCYTES 12 5 - 13 %    EOSINOPHILS 11 (H) 0 - 7 %    BASOPHILS 1 0 - 1 %    IMMATURE GRANULOCYTES 0 0 - 0.5 %    ABS. NEUTROPHILS 5.6 1.8 - 8.0 K/UL    ABS. LYMPHOCYTES 0.7 (L) 0.8 - 3.5 K/UL    ABS. MONOCYTES 0.9 0.0 - 1.0 K/UL    ABS. EOSINOPHILS 0.9 (H) 0.0 - 0.4 K/UL    ABS. BASOPHILS 0.0 0.0 - 0.1 K/UL    ABS. IMM. GRANS. 0.0 0.00 - 0.04 K/UL    DF AUTOMATED     METABOLIC PANEL, COMPREHENSIVE    Collection Time: 08/13/22  8:44 PM   Result Value Ref Range    Sodium 132 (L) 136 - 145 mmol/L    Potassium 4.7 3.5 - 5.1 mmol/L    Chloride 98 97 - 108 mmol/L    CO2 29 21 - 32 mmol/L    Anion gap 5 5 - 15 mmol/L    Glucose 111 (H) 65 - 100 mg/dL    BUN 26 (H) 6 - 20 mg/dL    Creatinine 6.44 (H) 0.70 - 1.30 mg/dL    BUN/Creatinine ratio 4 (L) 12 - 20      GFR est AA 10 (L) >60 ml/min/1.73m2    GFR est non-AA 8 (L) >60 ml/min/1.73m2    Calcium 8.7 8.5 - 10.1 mg/dL    Bilirubin, total 0.5 0.2 - 1.0 mg/dL    AST (SGOT) 28 15 - 37 U/L    ALT (SGPT) 20 12 - 78 U/L    Alk.  phosphatase 102 45 - 117 U/L    Protein, total 7.5 6.4 - 8.2 g/dL    Albumin 3.0 (L) 3.5 - 5.0 g/dL    Globulin 4.5 (H) 2.0 - 4.0 g/dL    A-G Ratio 0.7 (L) 1.1 - 2.2         Radiologic Studies -   @lastxrresult@  CT Results  (Last 48 hours)      None          CXR Results  (Last 48 hours)      None              Medical Decision Making   I am the first provider for this patient. I reviewed the vital signs, available nursing notes, past medical history, past surgical history, family history and social history. Vital Signs-Reviewed the patient's vital signs. Patient Vitals for the past 12 hrs:   Temp Pulse Resp BP SpO2   08/13/22 1914 98.1 °F (36.7 °C) 73 18 (!) 157/73 98 %       Records Reviewed: Nursing Notes    The patient presents with rash to upper extremities, torso, consisting of bullae and sloughed skin with a differential diagnosis of allergic reaction, TENS, bullous pemphigoid, medication reaction      Provider Notes (Medical Decision Making):     MDM  78-year-old male history of hypertension, lung cancer, recently started on a new chemotherapeutic agent presents emergency department for 3 weeks of blistering rash to extremities and torso. Patient went to see his primary care doctor, who referred him to a dermatologist however states the dermatologist was not able to see patient until approximately 2 weeks. Physical exam shows well-appearing male no distress, stable vitals, skin exam significant for multiple areas of bullae, some sloughing with crusting around open ulcerations, no spreading erythema no significant tenderness palpation. .  Patient has no obvious intraoral or ocular manifestations. Physical exam most consistent with bullous pemphigoid. Do not suspect systemic normal necrosis syndrome, or severe sepsis. Will draw basic lab work, CBC CMP, anticipate discharge patient home with oral steroid regimen, doxycycline. ED Course:   Initial assessment performed. The patients presenting problems have been discussed, and they are in agreement with the care plan formulated and outlined with them.   I have encouraged them to ask questions as they arise throughout their visit. ED Course as of 08/13/22 2128   Sat Aug 13, 2022   2106 CBC WITH AUTOMATED DIFF(!):    WBC 8.1   RBC 3.67(!)   HGB 12.1   HCT 36.5(!)   MCV 99.5(!)   MCH 33.0   MCHC 33.2   RDW 14.2   PLATELET 658   MPV 9.9   NRBC 0.0   ABSOLUTE NRBC 0.00   NEUTROPHILS 68   LYMPHOCYTES 8(!)   MONOCYTES 12   EOSINOPHILS 11(!)   BASOPHILS 1   IMMATURE GRANULOCYTES 0   ABS. NEUTROPHILS 5.6   ABS. LYMPHOCYTES 0.7(!)   ABS. MONOCYTES 0.9   ABS. EOSINOPHILS 0.9(!)   ABS. BASOPHILS 0.0   ABS. IMM. GRANS. 0.0   DF AUTOMATED  CBC shows no leukocytosis stable H&H [PZ]   2119 Note shows chronic renal sufficiency which is at patient's baseline patient was dialyzed earlier today. At this time feel that patient can be discharged home, will discharge with prednisone, doxycycline, instructed to follow-up with primary care physician in the next week as needed, continue to follow-up with dermatologist as appointed. [PZ]      ED Course User Index  [PZ] Noemi Wilder MD         PROCEDURES  Procedures         PLAN:  1. Current Discharge Medication List        START taking these medications    Details   doxycycline (VIBRA-TABS) 100 mg tablet Take 1 Tablet by mouth two (2) times a day for 7 days. Qty: 14 Tablet, Refills: 0  Start date: 8/13/2022, End date: 8/20/2022      predniSONE (STERAPRED DS) 10 mg dose pack Dispense as written  Qty: 21 Tablet, Refills: 0  Start date: 8/13/2022           2. Follow-up Information       Follow up With Specialties Details Why Contact Info    Kellie Soto MD Internal Medicine Physician In 1 week  [address] [facility],[address], [facility]  [phone]            Return to ED if worse     Diagnosis     Clinical Impression:   1.  Pemphigoid

## 2022-08-21 PROBLEM — M25.559 HIP PAIN: Status: RESOLVED | Noted: 2020-10-16 | Resolved: 2022-08-21

## 2022-08-21 PROBLEM — R07.81 PLEURITIC PAIN: Status: RESOLVED | Noted: 2020-10-16 | Resolved: 2022-08-21

## 2022-08-21 PROBLEM — Z11.59 NEED FOR HEPATITIS C SCREENING TEST: Status: ACTIVE | Noted: 2022-08-21

## 2022-08-24 ENCOUNTER — OFFICE VISIT (OUTPATIENT)
Dept: INTERNAL MEDICINE CLINIC | Age: 77
End: 2022-08-24
Payer: MEDICARE

## 2022-08-24 VITALS
HEIGHT: 66 IN | TEMPERATURE: 98 F | DIASTOLIC BLOOD PRESSURE: 68 MMHG | BODY MASS INDEX: 26.2 KG/M2 | SYSTOLIC BLOOD PRESSURE: 168 MMHG | HEART RATE: 64 BPM | WEIGHT: 163 LBS | OXYGEN SATURATION: 98 % | RESPIRATION RATE: 17 BRPM

## 2022-08-24 DIAGNOSIS — Z99.2 ANEMIA DUE TO CHRONIC KIDNEY DISEASE, ON CHRONIC DIALYSIS (HCC): ICD-10-CM

## 2022-08-24 DIAGNOSIS — T50.905A DRUG-INDUCED BULLOUS PEMPHIGOID: ICD-10-CM

## 2022-08-24 DIAGNOSIS — I25.119 ATHEROSCLEROSIS OF NATIVE CORONARY ARTERY OF NATIVE HEART WITH ANGINA PECTORIS (HCC): ICD-10-CM

## 2022-08-24 DIAGNOSIS — Z11.59 NEED FOR HEPATITIS C SCREENING TEST: ICD-10-CM

## 2022-08-24 DIAGNOSIS — Z95.0 CARDIAC PACEMAKER IN SITU: ICD-10-CM

## 2022-08-24 DIAGNOSIS — Z85.118 HISTORY OF LUNG CANCER: ICD-10-CM

## 2022-08-24 DIAGNOSIS — J44.9 CHRONIC OBSTRUCTIVE PULMONARY DISEASE, UNSPECIFIED COPD TYPE (HCC): ICD-10-CM

## 2022-08-24 DIAGNOSIS — L12.0 DRUG-INDUCED BULLOUS PEMPHIGOID: ICD-10-CM

## 2022-08-24 DIAGNOSIS — Z99.2 END STAGE RENAL FAILURE ON DIALYSIS (HCC): ICD-10-CM

## 2022-08-24 DIAGNOSIS — J84.112 IDIOPATHIC PULMONARY FIBROSIS (HCC): ICD-10-CM

## 2022-08-24 DIAGNOSIS — Z00.00 MEDICARE ANNUAL WELLNESS VISIT, SUBSEQUENT: ICD-10-CM

## 2022-08-24 DIAGNOSIS — I10 ESSENTIAL HYPERTENSION: ICD-10-CM

## 2022-08-24 DIAGNOSIS — N18.6 END STAGE RENAL FAILURE ON DIALYSIS (HCC): ICD-10-CM

## 2022-08-24 DIAGNOSIS — Z90.2 HISTORY OF LOBECTOMY OF LUNG: ICD-10-CM

## 2022-08-24 DIAGNOSIS — I25.10 CORONARY ARTERIOSCLEROSIS IN NATIVE ARTERY: ICD-10-CM

## 2022-08-24 DIAGNOSIS — D63.1 ANEMIA DUE TO CHRONIC KIDNEY DISEASE, ON CHRONIC DIALYSIS (HCC): ICD-10-CM

## 2022-08-24 DIAGNOSIS — I49.5 SINUS NODE DYSFUNCTION (HCC): ICD-10-CM

## 2022-08-24 DIAGNOSIS — N18.6 ANEMIA DUE TO CHRONIC KIDNEY DISEASE, ON CHRONIC DIALYSIS (HCC): ICD-10-CM

## 2022-08-24 DIAGNOSIS — C34.92 ADENOCARCINOMA OF LEFT LUNG (HCC): ICD-10-CM

## 2022-08-24 PROCEDURE — G8536 NO DOC ELDER MAL SCRN: HCPCS | Performed by: INTERNAL MEDICINE

## 2022-08-24 PROCEDURE — G8417 CALC BMI ABV UP PARAM F/U: HCPCS | Performed by: INTERNAL MEDICINE

## 2022-08-24 PROCEDURE — G8427 DOCREV CUR MEDS BY ELIG CLIN: HCPCS | Performed by: INTERNAL MEDICINE

## 2022-08-24 PROCEDURE — G0439 PPPS, SUBSEQ VISIT: HCPCS | Performed by: INTERNAL MEDICINE

## 2022-08-24 PROCEDURE — G8510 SCR DEP NEG, NO PLAN REQD: HCPCS | Performed by: INTERNAL MEDICINE

## 2022-08-24 PROCEDURE — 99213 OFFICE O/P EST LOW 20 MIN: CPT | Performed by: INTERNAL MEDICINE

## 2022-08-24 PROCEDURE — G9231 DOC ESRD DIA TRANS PREG: HCPCS | Performed by: INTERNAL MEDICINE

## 2022-08-24 PROCEDURE — 1101F PT FALLS ASSESS-DOCD LE1/YR: CPT | Performed by: INTERNAL MEDICINE

## 2022-08-24 RX ORDER — MINOXIDIL 2.5 MG/1
2.5 TABLET ORAL EVERY 12 HOURS
Qty: 180 TABLET | Refills: 2 | Status: SHIPPED | OUTPATIENT
Start: 2022-08-24 | End: 2022-09-17

## 2022-08-24 RX ORDER — HYDROXYZINE 25 MG/1
25 TABLET, FILM COATED ORAL
Qty: 90 TABLET | Refills: 1 | Status: SHIPPED | OUTPATIENT
Start: 2022-08-24 | End: 2022-09-23

## 2022-08-24 RX ORDER — ISOSORBIDE MONONITRATE 30 MG/1
30 TABLET, EXTENDED RELEASE ORAL DAILY
Qty: 90 TABLET | Refills: 2 | Status: SHIPPED | OUTPATIENT
Start: 2022-08-24 | End: 2022-09-17

## 2022-08-24 RX ORDER — NIFEDIPINE 60 MG/1
60 TABLET, EXTENDED RELEASE ORAL 2 TIMES DAILY
Qty: 180 TABLET | Refills: 2 | Status: SHIPPED | OUTPATIENT
Start: 2022-08-24

## 2022-08-24 RX ORDER — CLONIDINE HYDROCHLORIDE 0.1 MG/1
0.1 TABLET ORAL 2 TIMES DAILY
Qty: 180 TABLET | Refills: 2 | Status: SHIPPED | OUTPATIENT
Start: 2022-08-24

## 2022-08-24 RX ORDER — LABETALOL 200 MG/1
200 TABLET, FILM COATED ORAL EVERY 8 HOURS
Qty: 270 TABLET | Refills: 2 | Status: SHIPPED | OUTPATIENT
Start: 2022-08-24 | End: 2022-09-17

## 2022-08-24 RX ORDER — HYDRALAZINE HYDROCHLORIDE 25 MG/1
25 TABLET, FILM COATED ORAL EVERY 8 HOURS
Qty: 270 TABLET | Refills: 2 | Status: SHIPPED | OUTPATIENT
Start: 2022-08-24

## 2022-08-24 RX ORDER — DOXYCYCLINE 100 MG/1
100 CAPSULE ORAL 2 TIMES DAILY
COMMUNITY

## 2022-08-24 NOTE — PROGRESS NOTES
1. \"Have you been to the ER, urgent care clinic since your last visit? Hospitalized since your last visit? \" Yes When: 6/22 Where: [facility] Reason for visit: fell and hit his head    2. \"Have you seen or consulted any other health care providers outside of the [address] [place] [address] since your last visit? \" No     3. For patients aged 39-70: Has the patient had a colonoscopy / FIT/ Cologuard? No      If the patient is female:    4. For patients aged 41-77: Has the patient had a mammogram within the past 2 years? NA - based on age or sex      11. For patients aged 21-65: Has the patient had a pap smear? NA - based on age or sex        TTS dialysis went to er 8/19 blisteringhad chemo for    Chief Complaint   Patient presents with    Follow Up Chronic Condition    Hypertension    Hospital Follow Up        Mr. Pritesh beasley came for follow-up for chronic medical problems and for follow-up after recent ER visit on 19th August.  He had blistering skin rashes and he has been consulted by dermatologist and he is getting chemotherapy for his adenocarcinoma of the lung which is metastasizing to the supraclavicular lymph node and mediastinal lymph nodes and he has been diagnosis questionable bullous pemphigoid from the drug in use. He finished prednisone. He is getting doxycycline. He is getting hydroxyzine and going to run out of hydroxyzine so I gave him refills for local pharmacy [facility]    He is on multiple antihypertensive medicine having ESRD on hemodialysis also he is having a history of pulmonary fibrosis and CAD he gets dialysis on Tuesday Thursday Saturday. Today he has not taken any blood pressure medicine his blood pressure is elevated. He has AV fistula in left arm. He said he is doing good and he has taken all vaccines except Pneumovax 23 or 20. Recommended to get it from pharmacy I am not recommending him to get Shingrix vaccine because he is on chemotherapy which is holding right now.   He is under care of oncologist and dermatologist at [facility]. He has improvement in itching but I examined he has rashes on his upper extremities it is getting drying up however there is still few scattered blisters and bullae on his right upper arm. I saw the rashes on the legs on his upper trunk and on his back and both forearms and upper limb. Review of Systems   Constitutional: Negative. HENT:  Negative for sinus pain and sore throat. Eyes:  Negative for blurred vision. Respiratory:  Negative for cough, shortness of breath and wheezing. Cardiovascular: Negative. Gastrointestinal: Negative. Genitourinary:  Negative for urgency. Musculoskeletal: Negative. Skin:  Positive for itching and rash. Bullous pemphigoids on extremities on backand trunk and legs   Neurological:  Negative for dizziness and headaches. Psychiatric/Behavioral: Negative. Visit Vitals  BP (!) 168/68 (BP 1 Location: Right upper arm, BP Patient Position: Sitting, BP Cuff Size: Adult)   Pulse 64   Temp 98 °F (36.7 °C) (Temporal)   Resp 17   Ht 5' 6\" (1.676 m)   Wt 163 lb (73.9 kg)   SpO2 98%   BMI 26.31 kg/m²    This is the Subsequent Medicare Annual Wellness Exam, performed 12 months or more after the Initial AWV or the last Subsequent AWV    I have reviewed the patient's medical history in detail and updated the computerized patient record. He has no depression. He says he cannot afford to have depression because he has to take care of his wife who is bedridden. He is recommended to getPneumovax 23 or 20 vaccine from the pharmacy. I reviewed his immunization records. He is up to date for his Td vaccine. Since he was on chemotherapy and having active diagnosis of cancer which is metastatic adenocarcinoma of the lung/non-small cell lung cancer metastatic to the lymph nodes he should not get shingles vaccine however he can ask his oncologist.    He is getting dialysis.     He does not have advanced directory. 3 words recall and clock drawing is normal.    He does not need any DME. He does not have any grab bars or railings in his home. He says he is independent for ADL and IADL. Assessment/Plan   Education and counseling provided:  Are appropriate based on today's review and evaluation  End-of-Life planning (with patient's consent)  Pneumococcal Vaccine  Influenza Vaccine  Hepatitis B Vaccine  Prostate cancer screening tests (PSA, covered annually)  Screening for glaucoma    1. Medicare annual wellness visit, subsequent  2. Essential hypertension  3. End stage renal failure on dialysis ([facility]  4. Idiopathic pulmonary fibrosis ([facility]  5. Adenocarcinoma of left lung ([facility]  6. Drug-induced bullous pemphigoid  7. Coronary arteriosclerosis in native artery  8. Chronic obstructive pulmonary disease, unspecified COPD type ([facility]  9. Anemia due to chronic kidney disease, on chronic dialysis ([facility]  10. History of lobectomy of lung  11. History of lung cancer  12. Sinus node dysfunction (HCC)  13. Cardiac pacemaker in situ  14. Need for hepatitis C screening test  -     HEPATITIS C AB  15. Atherosclerosis of native coronary artery of native heart with angina pectoris [facility])       This is a combined visit. Hypertension uncontrolled but he has not taken any medicines he is on polypharmacy on multiple antihypertensive medicines for last several years. He is getting hemodialysis having end-stage renal disease getting dialysis on Tuesday Thursday and Saturday. He needs refills. Continued on    Hydralazine 25 mg every 8 hourly. Labetalol 200 mg 1 pill every 8 hourly. Minoxidil 2.5 mg every 12 hourly. Nifedipine ER 60 mg every 12 hourly. Getting clonidine 0.1 mg twice a day. He should have a blood pressure log. He says it remains normal at home. I will not make changes in his medicines because he has not taken any medicine today. Having dryness of mouth getting glycopyrrolate.     CAD but no angina for now I do not think he is following cardiology she is going through a lot he is getting isosorbide mononitrate ER 30 mg once a day refills from me    Adenocarcinoma/metastatic lung cancer under excellent care by oncologist I reviewed his notes. He is getting chemotherapy but however he developed bullous pemphigoid reaction on his skin and has been evaluated by dermatologist at [facility] thinking that it might be due to chemotherapy just finished course of prednisone. Continued on hydroxyzine 25 mg twice a day to be taken as needed for itching may cause drowsiness. He is still getting doxycycline prescribed by his dermatologist.  He is using topical cream.  He is having still some bullous lesion but it is improving after finishing prednisone. He has upcoming appointment with his oncologist and dermatologist.  He visited medical hematologist oncologist on 23rd August and dermatologist on 23rd August.    His chemo is on hold. He is having every 3 months imaging and monitoring. He has adenocarcinoma left lung stage III and it was diagnosed originally in 2016 and recurrence in 2019 and he had excellent response to chemo however recently it has been hold. He has pulmonary fibrosis and COPD with history of pack smoking. Continued on all medicines. His labs are monitored by all physicians so I will not order labs refills given on behalf of all consultants also. Follow-up in 6 months. Discussed to remain strong spiritually that he is already.       Depression Risk Factor Screening:     3 most recent PHQ Screens 8/24/2022   PHQ Not Done -   Little interest or pleasure in doing things Not at all   Feeling down, depressed, irritable, or hopeless Not at all   Total Score PHQ 2 0       Alcohol & Drug Abuse Risk Screen    Do you average more than 1 drink per night or more than 7 drinks a week: No    In the past three months have you have had more than 4 drinks containing alcohol on one occasion: No          Functional Ability and Level of Safety:    Hearing: Hearing is good. Activities of Daily Living: The home contains: no safety equipment. Patient does total self care      Ambulation: with no difficulty     Fall Risk:  Fall Risk Assessment, last 12 mths 8/24/2022   Able to walk? Yes   Fall in past 12 months? 1   Do you feel unsteady? 0   Are you worried about falling 0   Is TUG test greater than 12 seconds? 0   Is the gait abnormal? 0   Number of falls in past 12 months 1   Fall with injury?  1      Abuse Screen:  Patient is not abused       Cognitive Screening    Has your family/caregiver stated any concerns about your memory: no    Cognitive Screening: Abnormal - Clock Drawing Test    Health Maintenance Due     Health Maintenance Due   Topic Date Due    Hepatitis C Screening  Never done    Pneumococcal 65+ years (1 - PCV) Never done    COVID-19 Vaccine (4 - Booster for Morrison Peter series) 04/19/2022       Patient Care Team   Patient Care Team:  Dion Mcgarry MD as PCP - General (Internal Medicine Physician)  Dion Mcgarry MD as PCP - [facility] Provider    History     Patient Active Problem List   Diagnosis Code    Anemia D64.9    Essential hypertension I10    End stage renal failure on dialysis ([address] N18.6, Z99.2    Excessive salivation K11.7    History of lobectomy of lung Z90.2    Cardiac pacemaker in situ Z95.0    COPD (chronic obstructive pulmonary disease) ([address] J44.9    Coronary arteriosclerosis in native artery I25.10    Idiopathic pulmonary fibrosis ([address] J84.112    Olecranon bursitis M70.20    Sinus node dysfunction ([address] I49.5    History of lung cancer Z85.118    Need for hepatitis C screening test Z11.59    Adenocarcinoma of left lung ([address] C34.92    Drug-induced bullous pemphigoid L12.0, T50.905A    Medicare annual wellness visit, subsequent Z00.00    Atherosclerotic heart disease of native coronary artery with unspecified angina pectoris I25.119     Past Medical History:   Diagnosis Date Adenocarcinoma of left lung ([facility] 8/24/2022    Anemia 10/16/2020    Cardiac pacemaker in situ 10/16/2020    CHF (congestive heart failure) ([facility] 10/16/2020    COPD (chronic obstructive pulmonary disease) ([facility] 10/16/2020    Coronary arteriosclerosis in native artery 10/16/2020    Drug-induced bullous pemphigoid 8/24/2022    End stage renal failure on dialysis ([facility] 10/16/2020    Essential hypertension 10/16/2020    Excessive salivation 10/16/2020    H/O hernia repair 03/27/2013    Hip pain 10/16/2020    History of lobectomy of lung 10/16/2020    History of lobectomy of lung     Idiopathic pulmonary fibrosis ([facility] 10/16/2020    Olecranon bursitis 10/16/2020    Pleuritic pain 10/16/2020    Sinus node dysfunction ([facility] 10/16/2020      Past Surgical History:   Procedure Laterality Date    HX COLONOSCOPY      HX PACEMAKER      HX PACEMAKER PLACEMENT      VASCULAR SURGERY PROCEDURE UNLIST       Current Outpatient Medications   Medication Sig Dispense Refill    doxycycline (VIBRAMYCIN) 100 mg capsule Take 100 mg by mouth two (2) times a day. NIFEdipine ER (ADALAT CC) 60 mg ER tablet Take 1 Tablet by mouth two (2) times a day. 180 Tablet 2    hydrOXYzine HCL (ATARAX) 25 mg tablet Take 1 Tablet by mouth three (3) times daily as needed for Itching for up to 30 days. 90 Tablet 1    cloNIDine HCL (CATAPRES) 0.1 mg tablet Take 1 Tablet by mouth two (2) times a day. 180 Tablet 2    hydrALAZINE (APRESOLINE) 25 mg tablet Take 1 Tablet by mouth every eight (8) hours. 270 Tablet 2    isosorbide mononitrate ER (IMDUR) 30 mg tablet Take 1 Tablet by mouth daily. 90 Tablet 2    labetaloL (NORMODYNE) 200 mg tablet Take 1 Tablet by mouth every eight (8) hours. 270 Tablet 2    minoxidiL (LONITEN) 2.5 mg tablet Take 1 Tablet by mouth every twelve (12) hours.  180 Tablet 2    albuterol (PROVENTIL HFA, VENTOLIN HFA, PROAIR HFA) 90 mcg/actuation inhaler USE 2 INHALATIONS EVERY 4 HOURS AS NEEDED FOR WHEEZING, ASTHMA ATTACK 25.5 g 2 glycopyrrolate (ROBINUL) 1 mg tablet TAKE 1 TABLET TWICE A DAY AS NEEDED 180 Tab 3    calcium acetate,phosphat bind, (PHOSLYRA) 667 mg (169 mg calcium)/5 mL soln ORAL SOLUTION Take 1 Tab by mouth three (3) times daily. cyanocobalamin 1,000 mcg tablet Take 1 Tab by mouth daily. aspirin delayed-release 81 mg tablet Take 1 Tab by mouth daily. multivit-min/FA/lycopen/lutein (CENTRUM SILVER MEN PO) Take 1 Tab by mouth daily. ergocalciferol, vitamin d2, 10 mcg (400 unit) tab Take 1 Tablet by mouth daily. acetaminophen (TYLENOL) 325 mg tablet Take 1 Tab by mouth as needed.        Allergies   Allergen Reactions    Pcn [Penicillins] Nausea and Vomiting     ALSO CAUSES HA'S       Family History   Problem Relation Age of Onset    Diabetes Mother      Social History     Tobacco Use    Smoking status: Former    Smokeless tobacco: Never    Tobacco comments:     QUIT 1970   Substance Use Topics    Alcohol use: Not Currently         Tonya Serna

## 2022-09-20 PROBLEM — Z11.59 NEED FOR HEPATITIS C SCREENING TEST: Status: RESOLVED | Noted: 2022-08-21 | Resolved: 2022-09-20

## 2022-09-23 PROBLEM — Z00.00 MEDICARE ANNUAL WELLNESS VISIT, SUBSEQUENT: Status: RESOLVED | Noted: 2022-08-24 | Resolved: 2022-09-23

## 2022-12-05 ENCOUNTER — HOSPITAL ENCOUNTER (EMERGENCY)
Age: 77
Discharge: HOME | End: 2022-12-05
Attending: EMERGENCY MEDICINE | Admitting: EMERGENCY MEDICINE
Payer: MEDICARE

## 2022-12-05 VITALS
OXYGEN SATURATION: 96 % | RESPIRATION RATE: 18 BRPM | SYSTOLIC BLOOD PRESSURE: 136 MMHG | BODY MASS INDEX: 25.07 KG/M2 | WEIGHT: 156 LBS | TEMPERATURE: 99.9 F | HEART RATE: 77 BPM | DIASTOLIC BLOOD PRESSURE: 62 MMHG | HEIGHT: 66 IN

## 2022-12-05 DIAGNOSIS — U07.1 COVID-19: Primary | ICD-10-CM

## 2022-12-05 LAB
COVID-19 RAPID TEST, COVR: DETECTED
FLUAV AG NPH QL IA: NEGATIVE
FLUBV AG NOSE QL IA: NEGATIVE

## 2022-12-05 PROCEDURE — 87804 INFLUENZA ASSAY W/OPTIC: CPT

## 2022-12-05 PROCEDURE — 36415 COLL VENOUS BLD VENIPUNCTURE: CPT

## 2022-12-05 PROCEDURE — 99283 EMERGENCY DEPT VISIT LOW MDM: CPT

## 2022-12-05 PROCEDURE — 87635 SARS-COV-2 COVID-19 AMP PRB: CPT

## 2022-12-05 RX ORDER — NIRMATRELVIR AND RITONAVIR 150-100 MG
2 KIT ORAL EVERY 12 HOURS
Qty: 1 BOX | Refills: 0 | Status: CANCELLED | OUTPATIENT
Start: 2022-12-05 | End: 2022-12-10

## 2022-12-06 NOTE — ED PROVIDER NOTES
EMERGENCY DEPARTMENT HISTORY AND PHYSICAL EXAM      Date: 12/5/2022  Patient Name: Karmen Pruitt      History of Presenting Illness     Chief Complaint   Patient presents with    Sore Throat    Cough    Chills       History Provided By: Patient    HPI: Karmen Pruitt, 69 y.o. male with a past medical history significant for ESRD on HD, CHF, COPD presents to the ED with cc of sore throat, chills, cough, runny nose. Onset 3-4d ago. Wife positive with COVID in ICU due to ALS. Pt denies SOB, CP, N/V/D. Feels okay just mostly having odynophagia. There are no other complaints, changes, or physical findings at this time. PCP: Keerthi Roberson MD    Current Outpatient Medications   Medication Sig Dispense Refill    minoxidiL (LONITEN) 2.5 mg tablet Take 1 Tablet by mouth every twelve (12) hours. 180 Tablet 2    labetaloL (NORMODYNE) 200 mg tablet Take 1 Tablet by mouth every eight (8) hours. 270 Tablet 2    isosorbide mononitrate ER (IMDUR) 30 mg tablet Take 1 Tablet by mouth daily. 90 Tablet 2    doxycycline (VIBRAMYCIN) 100 mg capsule Take 100 mg by mouth two (2) times a day. NIFEdipine ER (ADALAT CC) 60 mg ER tablet Take 1 Tablet by mouth two (2) times a day. 180 Tablet 2    cloNIDine HCL (CATAPRES) 0.1 mg tablet Take 1 Tablet by mouth two (2) times a day. 180 Tablet 2    hydrALAZINE (APRESOLINE) 25 mg tablet Take 1 Tablet by mouth every eight (8) hours. 270 Tablet 2    albuterol (PROVENTIL HFA, VENTOLIN HFA, PROAIR HFA) 90 mcg/actuation inhaler USE 2 INHALATIONS EVERY 4 HOURS AS NEEDED FOR WHEEZING, ASTHMA ATTACK 25.5 g 2    glycopyrrolate (ROBINUL) 1 mg tablet TAKE 1 TABLET TWICE A DAY AS NEEDED 180 Tab 3    calcium acetate,phosphat bind, (PHOSLYRA) 667 mg (169 mg calcium)/5 mL soln ORAL SOLUTION Take 1 Tab by mouth three (3) times daily. cyanocobalamin 1,000 mcg tablet Take 1 Tab by mouth daily. aspirin delayed-release 81 mg tablet Take 1 Tab by mouth daily. multivit-min/FA/lycopen/lutein (CENTRUM SILVER MEN PO) Take 1 Tab by mouth daily. ergocalciferol, vitamin d2, 10 mcg (400 unit) tab Take 1 Tablet by mouth daily. acetaminophen (TYLENOL) 325 mg tablet Take 1 Tab by mouth as needed. Past History     Past Medical History:  Past Medical History:   Diagnosis Date    Adenocarcinoma of left lung ([address] 8/24/2022    Anemia 10/16/2020    Cardiac pacemaker in situ 10/16/2020    CHF (congestive heart failure) ([address] 10/16/2020    COPD (chronic obstructive pulmonary disease) ([address] 10/16/2020    Coronary arteriosclerosis in native artery 10/16/2020    Drug-induced bullous pemphigoid 8/24/2022    End stage renal failure on dialysis ([address] 10/16/2020    Essential hypertension 10/16/2020    Excessive salivation 10/16/2020    H/O hernia repair 03/27/2013    Hip pain 10/16/2020    History of lobectomy of lung 10/16/2020    History of lobectomy of lung     Idiopathic pulmonary fibrosis ([address] 10/16/2020    Olecranon bursitis 10/16/2020    Pleuritic pain 10/16/2020    Sinus node dysfunction ([address] 10/16/2020       Past Surgical History:  Past Surgical History:   Procedure Laterality Date    HX COLONOSCOPY      HX PACEMAKER      HX PACEMAKER PLACEMENT      VASCULAR SURGERY PROCEDURE UNLIST         Family History:  Family History   Problem Relation Age of Onset    Diabetes Mother        Social History:  Social History     Tobacco Use    Smoking status: Former    Smokeless tobacco: Never    Tobacco comments:     QUIT 1970   Vaping Use    Vaping Use: Never used   Substance Use Topics    Alcohol use: Not Currently    Drug use: Never       Allergies: Allergies   Allergen Reactions    Pcn [Penicillins] Nausea and Vomiting     ALSO CAUSES HA'S         Review of Systems   Constitutional: Negative except as in HPI. Eyes: Negative except as in HPI.  ENT: Negative except as in HPI. Cardiovascular: Negative except as in HPI.   Respiratory: Negative except as in HPI.  Gastrointestinal: Negative except as in HPI. Genitourinary: Negative except as in HPI. Musculoskeletal: Negative except as in HPI. Integumentary: Negative except as in HPI. Neurological: Negative except as in HPI. Psychiatric: Negative except as in HPI. Endocrine: Negative except as in HPI. Hematologic/Lymphatic: Negative except as in HPI. Allergic/Immunologic: Negative except as in HPI. Physical Exam   Constitutional: Awake and alert, interactive, NAD  Eyes: PERRL, no injection or scleral icterus, no discharge  HEENT: NCAT, neck supple, MMM, no oropharyngeal exudates  CV: RRR, no m/r/g  Respiratory: CTAB, no r/r/w  GI: Abd soft, nondistended, nontender  : Deferred  MSK: FROM, no joint effusions or edema  Skin: No rashes  Neuro: CN2-12 intact, symmetric facies, fluent speech. Psych: Well-groomed, normal speech, behavior, appropriate mood    Lab and Diagnostic Study Results     Labs -   No results found for this or any previous visit (from the past 12 hour(s)). Radiologic Studies -   [unfilled]  CT Results  (Last 48 hours)      None          CXR Results  (Last 48 hours)      None            Medical Decision Making and ED Course   - I am the first and primary provider for this patient AND AM THE PRIMARY PROVIDER OF RECORD. - I reviewed the vital signs, available nursing notes, past medical history, past surgical history, family history and social history. - Initial assessment performed. The patients presenting problems have been discussed, and the staff are in agreement with the care plan formulated and outlined with them. I have encouraged them to ask questions as they arise throughout their visit. Vital Signs-Reviewed the patient's vital signs.     Patient Vitals for the past 12 hrs:   Temp Pulse Resp BP SpO2   12/05/22 2120 99.9 °F (37.7 °C) 77 18 136/62 96 %       EKG interpretation:         Provider Notes (Medical Decision Making):   68D w/viral syndrome, likely COVID given wife positive. Given ESRD on HD not a candidate for paxlovid. Given no hypoxia would have worse outcomes with dose of steroids, so will defer for odynophagia. Will discharge with return precautions. ED Course:              Disposition     Disposition: DC- Adult Discharges: All of the diagnostic tests were reviewed and questions answered. Diagnosis, care plan and treatment options were discussed. The patient understands the instructions and will follow up as directed. The patients results have been reviewed with them. They have been counseled regarding their diagnosis. The patient and family member patient and other:  granddaughter verbally convey understanding and agreement of the signs, symptoms, diagnosis, treatment and prognosis and additionally agrees to follow up as recommended with their PCP in 24 - 48 hours. They also agree with the care-plan and convey that all of their questions have been answered. I have also put together some discharge instructions for them that include: 1) educational information regarding their diagnosis, 2) how to care for their diagnosis at home, as well a 3) list of reasons why they would want to return to the ED prior to their follow-up appointment, should their condition change. Discharged      Diagnosis     Clinical Impression:   1. COVID-19        Attestations:     Joel Lozoya MD

## 2022-12-06 NOTE — DISCHARGE INSTRUCTIONS
You were seen in the ER for your COVID-like symptoms. Since your wife is positive for COVID, you likely have it as well. Continue getting dialysis as scheduled. Call Dr. Brandie Trejo for any problems getting this completed. Unfortunately, your dialysis precludes you from getting the oral treatment for COVID called paxlovid. Thankfully, your vital signs are all normal including your oxygen. This means that you will do worse on steroids, however, so we are not prescribing you that medication either. Follow up with your primary care doctor in the next week or so to make sure you are getting better. Return to the ER for any difficulty breathing, uncontrollable vomiting or diarrhea, or any other new or concerning symptoms. Thank you! Thank you for allowing me to care for you in the emergency department. It is my goal to provide you with excellent care. If you have not received excellent quality care, please ask to speak to the nurse manager. Please fill out the survey that will come to you by mail or email since we listen to your feedback! Below you will find a list of your tests from today's visit. Should you have any questions, please do not hesitate to call the emergency department. Labs  No results found for this or any previous visit (from the past 12 hour(s)). Radiologic Studies  No orders to display     CT Results  (Last 48 hours)      None          CXR Results  (Last 48 hours)      None          ------------------------------------------------------------------------------------------------------------  The exam and treatment you received in the Emergency Department were for an urgent problem and are not intended as complete care. It is important that you follow-up with a doctor, nurse practitioner, or physician assistant to:  (1) confirm your diagnosis,  (2) re-evaluation of changes in your illness and treatment, and  (3) for ongoing care.  Please take your discharge instructions with you when you go to your follow-up appointment. If you have any problem arranging a follow-up appointment, contact the Emergency Department. If your symptoms become worse or you do not improve as expected and you are unable to reach your health care provider, please return to the Emergency Department. We are available 24 hours a day. If a prescription has been provided, please have it filled as soon as possible to prevent a delay in treatment. If you have any questions or reservations about taking the medication due to side effects or interactions with other medications, please call your primary care provider or contact the ER.

## 2022-12-06 NOTE — ED NOTES
Pt has been living in home with a COVID+ family members. Pt is a Tuesday, Thursday, Saturday dialysis patient.

## 2022-12-07 ENCOUNTER — HOSPITAL ENCOUNTER (EMERGENCY)
Age: 77
Discharge: HOME | End: 2022-12-07
Attending: STUDENT IN AN ORGANIZED HEALTH CARE EDUCATION/TRAINING PROGRAM | Admitting: STUDENT IN AN ORGANIZED HEALTH CARE EDUCATION/TRAINING PROGRAM
Payer: MEDICARE

## 2022-12-07 VITALS
RESPIRATION RATE: 20 BRPM | BODY MASS INDEX: 25.07 KG/M2 | DIASTOLIC BLOOD PRESSURE: 76 MMHG | WEIGHT: 156 LBS | TEMPERATURE: 98.2 F | HEIGHT: 66 IN | OXYGEN SATURATION: 100 % | SYSTOLIC BLOOD PRESSURE: 168 MMHG | HEART RATE: 72 BPM

## 2022-12-07 DIAGNOSIS — J02.9 ACUTE PHARYNGITIS, UNSPECIFIED ETIOLOGY: Primary | ICD-10-CM

## 2022-12-07 PROCEDURE — 99283 EMERGENCY DEPT VISIT LOW MDM: CPT | Performed by: STUDENT IN AN ORGANIZED HEALTH CARE EDUCATION/TRAINING PROGRAM

## 2022-12-07 PROCEDURE — 74011000250 HC RX REV CODE- 250: Performed by: STUDENT IN AN ORGANIZED HEALTH CARE EDUCATION/TRAINING PROGRAM

## 2022-12-07 PROCEDURE — 74011250637 HC RX REV CODE- 250/637: Performed by: STUDENT IN AN ORGANIZED HEALTH CARE EDUCATION/TRAINING PROGRAM

## 2022-12-07 RX ORDER — AZITHROMYCIN 500 MG/1
500 TABLET, FILM COATED ORAL
Status: COMPLETED | OUTPATIENT
Start: 2022-12-07 | End: 2022-12-07

## 2022-12-07 RX ORDER — ACETAMINOPHEN 325 MG/1
650 TABLET ORAL
Qty: 20 TABLET | Refills: 0 | Status: SHIPPED | OUTPATIENT
Start: 2022-12-07

## 2022-12-07 RX ORDER — AMOXICILLIN 500 MG/1
500 CAPSULE ORAL
Status: DISCONTINUED | OUTPATIENT
Start: 2022-12-07 | End: 2022-12-07

## 2022-12-07 RX ORDER — LIDOCAINE HYDROCHLORIDE 20 MG/ML
15 SOLUTION OROPHARYNGEAL
Status: COMPLETED | OUTPATIENT
Start: 2022-12-07 | End: 2022-12-07

## 2022-12-07 RX ORDER — DEXAMETHASONE SODIUM PHOSPHATE 4 MG/ML
6 INJECTION, SOLUTION INTRA-ARTICULAR; INTRALESIONAL; INTRAMUSCULAR; INTRAVENOUS; SOFT TISSUE ONCE
Status: COMPLETED | OUTPATIENT
Start: 2022-12-07 | End: 2022-12-07

## 2022-12-07 RX ORDER — HYDROCODONE BITARTRATE AND ACETAMINOPHEN 5; 325 MG/1; MG/1
1 TABLET ORAL
Qty: 6 TABLET | Refills: 0 | Status: SHIPPED | OUTPATIENT
Start: 2022-12-07 | End: 2022-12-09

## 2022-12-07 RX ORDER — MAG HYDROX/ALUMINUM HYD/SIMETH 200-200-20
30 SUSPENSION, ORAL (FINAL DOSE FORM) ORAL ONCE
Status: COMPLETED | OUTPATIENT
Start: 2022-12-07 | End: 2022-12-07

## 2022-12-07 RX ORDER — AMOXICILLIN 500 MG/1
500 TABLET, FILM COATED ORAL 2 TIMES DAILY
Qty: 14 TABLET | Refills: 0 | Status: SHIPPED | OUTPATIENT
Start: 2022-12-07 | End: 2022-12-07 | Stop reason: SINTOL

## 2022-12-07 RX ORDER — BACITRACIN 500 [USP'U]/G
OINTMENT TOPICAL 3 TIMES DAILY
Qty: 28 G | Refills: 0 | Status: SHIPPED | OUTPATIENT
Start: 2022-12-07 | End: 2022-12-17

## 2022-12-07 RX ORDER — GUAIFENESIN 100 MG/5ML
200 SOLUTION ORAL
Qty: 200 ML | Refills: 0 | Status: SHIPPED | OUTPATIENT
Start: 2022-12-07

## 2022-12-07 RX ORDER — ONDANSETRON 4 MG/1
4 TABLET, FILM COATED ORAL
Qty: 20 TABLET | Refills: 0 | Status: SHIPPED | OUTPATIENT
Start: 2022-12-07

## 2022-12-07 RX ORDER — AZITHROMYCIN 500 MG/1
500 TABLET, FILM COATED ORAL DAILY
Qty: 5 TABLET | Refills: 0 | Status: SHIPPED | OUTPATIENT
Start: 2022-12-07 | End: 2022-12-12

## 2022-12-07 RX ADMIN — ALUMINUM HYDROXIDE, MAGNESIUM HYDROXIDE, AND SIMETHICONE 30 ML: 200; 200; 20 SUSPENSION ORAL at 06:25

## 2022-12-07 RX ADMIN — Medication 15 ML: at 06:25

## 2022-12-07 RX ADMIN — AZITHROMYCIN MONOHYDRATE 500 MG: 500 TABLET ORAL at 06:32

## 2022-12-07 RX ADMIN — DEXAMETHASONE SODIUM PHOSPHATE 6 MG: 4 INJECTION, SOLUTION INTRA-ARTICULAR; INTRALESIONAL; INTRAMUSCULAR; INTRAVENOUS; SOFT TISSUE at 06:24

## 2022-12-07 NOTE — ED TRIAGE NOTES
Pt was diagnosed with a cold the other night and pt now c/o his throat feeling like \"knives going down his throat\".

## 2022-12-07 NOTE — DISCHARGE INSTRUCTIONS
Thank you! Thank you for allowing me to care for you in the emergency department. I sincerely hope that you are satisfied with your visit today. It is my goal to provide you with excellent care. Below you will find a list of your labs and imaging from your visit today if applicable. Should you have any questions regarding these results please do not hesitate to call the emergency department. Please review [facility] for a more detailed result list since the below list may not be comprehensive. Instructions on how to sign up to [facility] should be provided in this packet. Labs -   No results found for this or any previous visit (from the past 12 hour(s)). Radiologic Studies -   No orders to display     CT Results  (Last 48 hours)      None          CXR Results  (Last 48 hours)      None               If you feel that you have not received excellent quality care or timely care, please ask to speak to the nurse manager. Please choose us in the future for your continued health care needs. ------------------------------------------------------------------------------------------------------------  The exam and treatment you received in the Emergency Department were for an urgent problem and are not intended as complete care. It is important that you follow-up with a doctor, nurse practitioner, or physician assistant to:  (1) confirm your diagnosis,  (2) re-evaluation of changes in your illness and treatment, and  (3) for ongoing care. If your symptoms become worse or you do not improve as expected and you are unable to reach your usual health care provider, you should return to the Emergency Department. We are available 24 hours a day. Please take your discharge instructions with you when you go to your follow-up appointment. If a prescription has been provided, please have it filled as soon as possible to prevent a delay in treatment.  Read the entire medication instruction sheet provided to you by the pharmacy. If you have any questions or reservations about taking the medication due to side effects or interactions with other medications, please call your primary care physician or contact the ER to speak with the charge nurse. Make an appointment with your family doctor or the physician you were referred to for follow-up of this visit as instructed on your discharge paperwork, as this is a mandatory follow-up. Return to the ER if you are unable to be seen or if you are unable to be seen in a timely manner. If you have any problem arranging the follow-up visit, contact the Emergency Department immediately.

## 2022-12-07 NOTE — ED PROVIDER NOTES
[id number]  EMERGENCY DEPARTMENT ENCOUNTER NOTE    Date: 12/7/2022  Patient Name: Crosby Buerger    History of Presenting Illness     Chief Complaint   Patient presents with    Sore Throat     HPI: Crosby Buerger, 69 y.o. male with a past medical history and home medications as listed below presents for sore throat. The patient reports a 2 day history of the following symptoms: Fatigue, Myalgias, Arthralgias, Congestion, Sore Throat, and Headache. The patient does not report any Nausea, Vomiting, Abdominal Pain, Chest Pain, and Shortness of breath. The patient was diagnosed with COVID 2 days ago. He denies any chest pain, shortness of breath, fevers, however does report muscle aches. Sore throat has been progressively slowly worsening and reports pain on swallowing.     Airway obstructive such as stridor, difficulty in swallowing secretions, thickened voice change significantly muffled voice, and significant neck swelling is not present    Medical History   I reviewed the medical, surgical, family, and social history, as well as allergies:    PCP: Luis Fernando Jordan MD    Past Medical History:  Past Medical History:   Diagnosis Date    Adenocarcinoma of left lung ([address] 8/24/2022    Anemia 10/16/2020    Cardiac pacemaker in situ 10/16/2020    CHF (congestive heart failure) ([address] 10/16/2020    COPD (chronic obstructive pulmonary disease) ([address] 10/16/2020    Coronary arteriosclerosis in native artery 10/16/2020    Drug-induced bullous pemphigoid 8/24/2022    End stage renal failure on dialysis ([address] 10/16/2020    Essential hypertension 10/16/2020    Excessive salivation 10/16/2020    H/O hernia repair 03/27/2013    Hip pain 10/16/2020    History of lobectomy of lung 10/16/2020    History of lobectomy of lung     Idiopathic pulmonary fibrosis ([address] 10/16/2020    Olecranon bursitis 10/16/2020    Pleuritic pain 10/16/2020    Sinus node dysfunction ([address] 10/16/2020 Past Surgical History:  Past Surgical History:   Procedure Laterality Date    HX COLONOSCOPY      HX PACEMAKER      HX PACEMAKER PLACEMENT      VASCULAR SURGERY PROCEDURE UNLIST       Current Outpatient Medications:  Current Outpatient Medications   Medication Instructions    acetaminophen (TYLENOL) 325 mg tablet 1 Tablet, Oral, AS NEEDED    acetaminophen (TYLENOL) 650 mg, Oral, EVERY 6 HOURS AS NEEDED    albuterol (PROVENTIL HFA, VENTOLIN HFA, PROAIR HFA) 90 mcg/actuation inhaler USE 2 INHALATIONS EVERY 4 HOURS AS NEEDED FOR WHEEZING, ASTHMA ATTACK    aspirin delayed-release 81 mg tablet 1 Tablet, Oral, DAILY    azithromycin (ZITHROMAX TRI-BILLY) 500 mg, Oral, DAILY    bacitracin (BACITRACIN) 500 unit/gram oint Topical, 3 TIMES DAILY, Apply to affected area    benzocaine-menthoL (Cepacol Sore Throat, vivien-men,) 15-2.6 mg lozg lozenge 1 Lozenge, Oral, EVERY 2 HOURS AS NEEDED    calcium acetate,phosphat bind, (PHOSLYRA) 667 mg (169 mg calcium)/5 mL soln ORAL SOLUTION 1 Tablet, Oral, 3 TIMES DAILY    cloNIDine HCL (CATAPRES) 0.1 mg, Oral, 2 TIMES DAILY    cyanocobalamin 1,000 mcg tablet 1 Tablet, Oral, DAILY    doxycycline (VIBRAMYCIN) 100 mg, Oral, 2 TIMES DAILY    ergocalciferol, vitamin d2, 10 mcg (400 unit) tab 1 Tablet, Oral, DAILY    glycopyrrolate (ROBINUL) 1 mg tablet TAKE 1 TABLET TWICE A DAY AS NEEDED    guaiFENesin (ROBITUSSIN) 200 mg, Oral, 2 TIMES DAILY AS NEEDED    hydrALAZINE (APRESOLINE) 25 mg, Oral, EVERY 8 HOURS    HYDROcodone-acetaminophen (Norco) 5-325 mg per tablet 1 Tablet, Oral, EVERY 8 HOURS AS NEEDED    isosorbide mononitrate ER (IMDUR) 30 mg, Oral, DAILY    labetaloL (NORMODYNE) 200 mg, Oral, EVERY 8 HOURS    minoxidiL (LONITEN) 2.5 mg, Oral, EVERY 12 HOURS    multivit-min/FA/lycopen/lutein (CENTRUM SILVER MEN PO) 1 Tablet, Oral, DAILY    NIFEdipine ER (ADALAT CC) 60 mg, Oral, 2 TIMES DAILY    ondansetron hcl (ZOFRAN) 4 mg, Oral, EVERY 8 HOURS AS NEEDED      Family History:  Family History Problem Relation Age of Onset    Diabetes Mother      Social History:  Social History     Tobacco Use    Smoking status: Former    Smokeless tobacco: Never    Tobacco comments:     QUIT 1970   Vaping Use    Vaping Use: Never used   Substance Use Topics    Alcohol use: Not Currently    Drug use: Never     Allergies: Allergies   Allergen Reactions    Pcn [Penicillins] Nausea and Vomiting     ALSO CAUSES HA'S       Review of Systems     Positives and pertinent negatives as per HPI. All other systems were reviewed and are negative. Physical Exam and Vital Signs   Vital Signs - Reviewed the patient's vital signs. Patient Vitals for the past 12 hrs:   Temp Pulse Resp BP SpO2   12/07/22 0626 97.6 °F (36.4 °C) 72 13 (!) 175/87 98 %   12/07/22 0606 98.3 °F (36.8 °C) 88 20 (!) 161/73 99 %     Physical Exam:    GENERAL: awake, alert, cooperative, not in distress  HEENT  * Pupils equal, head atraumatic  * Normal voice, no drooling, no stridor  * No facial swelling, erythema, or cellulitis  * No sublingual fullness  * Normal dentition  * No aphthous ulcers  * No periapical loading tenderness  * Normal uvula without deviation  * No intraoral/peritonsillar abscess appreciated. * Noted pharyngeal erythema  CV:  * warm extremities  * no cyanosis  PULMONARY: no respiratory distress, non labored breathing, no audible wheezing or stridor, no accessory muscle use  ABDOMEN: soft, moving in bed and pulls to seated position without grimace or pain. Rash over the scrotum and lower abdomen, with 2 small ulcers consistent with friction dermatitis on the scrotum that is being covered currently with Vaseline by the family. EXTREMITIES/BACK: moving four extremities without limitation  SKIN: no rashes or signs of trauma  NEURO:  * Speech clear  * GCS 15      Medical Decision Making   - I am the first and primary provider for this patient and am the primary provider of record.   - I reviewed the vital signs, available nursing notes, past medical history, past surgical history, family history and social history. - Initial assessment performed. The patients presenting problems have been discussed, and the staff are in agreement with the care plan formulated and outlined with them. I have encouraged them to ask questions as they arise throughout their visit. - Available medical records, nursing notes, old EKGs, and EMS run sheets (if patient was EMS transported) were reviewed    MDM:   Patient is a 69 y.o. male presenting for sore throat. Vitals reveal no significant abnormalities, and physical exam reveals  pharyngegal erythema . Based on the history, physical exam, risk factors, and vital signs, differential includes Strep throat, pharyngitis, URI, COVID19, Flu, postnasal drip. No concern for large abscess or obstruction. Results     Labs:  No results found for this or any previous visit (from the past 12 hour(s)). Radiologic Studies:  CT Results  (Last 48 hours)      None          CXR Results  (Last 48 hours)      None          Medications ordered:  Medications   alum-mag hydroxide-simeth (MYLANTA) oral suspension 30 mL (30 mL Oral Given 12/7/22 0625)   lidocaine (XYLOCAINE) 2 % viscous solution 15 mL (15 mL Mouth/Throat Given 12/7/22 0625)   dexamethasone (DECADRON) 4 mg/mL Oral 6 mg (6 mg Oral Given 12/7/22 0624)   azithromycin (ZITHROMAX) tablet 500 mg (500 mg Oral Given 12/7/22 0514)     ED Course and Reassessment     ED Course:          Reassessment:    Patient is well appearing, not in respiratory distress and has no airway obstructive findings. Due to normal exam, there is no concern for malignant process. Patient does not meet criteria for admission. Evaluation in the ED reassuring. Understanding was insured that at this time there is no evidence for a more malignant underlying process, but that early in the process of an illness, an emergency department workup can be falsely reassuring.      Routine discharge counseling was given including the fact that any worsening, changing or persistent symptoms should prompt an immediate call or follow up with their primary physician or the emergency department. The importance of appropriate follow up was also discussed. More extensive discharge instructions were given in the patient's discharge paperwork. After completion of evaluation and discussion of results and diagnoses, all the questions were answered. If required, all follow up appointments and treatments were discussed and explained. Understanding was insured prior to discharge. Final Disposition     DISPOSITION: DISCHARGED    Patient will be discharged from the Emergency Department in stable condition. All of the diagnostic tests were reviewed and any questions were answered. Diagnosis, results, follow up if applicable, and return precautions were discussed. I have also put together printed discharge instructions for them that include: 1) educational information regarding their diagnosis, 2) how to care for their diagnosis at home, as well a 3) list of reasons why they would want to return to the ED prior to their follow-up appointment, should their condition change. Any labs or imaging done in the ED will be either printed with the discharge paperwork or available through [address] DISCHARGE PLAN:  1. Current Discharge Medication List        CONTINUE these medications which have NOT CHANGED    Details   minoxidiL (LONITEN) 2.5 mg tablet Take 1 Tablet by mouth every twelve (12) hours. Qty: 180 Tablet, Refills: 2      labetaloL (NORMODYNE) 200 mg tablet Take 1 Tablet by mouth every eight (8) hours. Qty: 270 Tablet, Refills: 2      isosorbide mononitrate ER (IMDUR) 30 mg tablet Take 1 Tablet by mouth daily. Qty: 90 Tablet, Refills: 2      doxycycline (VIBRAMYCIN) 100 mg capsule Take 100 mg by mouth two (2) times a day. NIFEdipine ER (ADALAT CC) 60 mg ER tablet Take 1 Tablet by mouth two (2) times a day.   Qty: 180 Tablet, Refills: 2      cloNIDine HCL (CATAPRES) 0.1 mg tablet Take 1 Tablet by mouth two (2) times a day. Qty: 180 Tablet, Refills: 2      hydrALAZINE (APRESOLINE) 25 mg tablet Take 1 Tablet by mouth every eight (8) hours. Qty: 270 Tablet, Refills: 2      albuterol (PROVENTIL HFA, VENTOLIN HFA, PROAIR HFA) 90 mcg/actuation inhaler USE 2 INHALATIONS EVERY 4 HOURS AS NEEDED FOR WHEEZING, ASTHMA ATTACK  Qty: 25.5 g, Refills: 2      glycopyrrolate (ROBINUL) 1 mg tablet TAKE 1 TABLET TWICE A DAY AS NEEDED  Qty: 180 Tab, Refills: 3      calcium acetate,phosphat bind, (PHOSLYRA) 667 mg (169 mg calcium)/5 mL soln ORAL SOLUTION Take 1 Tab by mouth three (3) times daily. cyanocobalamin 1,000 mcg tablet Take 1 Tab by mouth daily. aspirin delayed-release 81 mg tablet Take 1 Tab by mouth daily. multivit-min/FA/lycopen/lutein (CENTRUM SILVER MEN PO) Take 1 Tab by mouth daily. ergocalciferol, vitamin d2, 10 mcg (400 unit) tab Take 1 Tablet by mouth daily. acetaminophen (TYLENOL) 325 mg tablet Take 1 Tab by mouth as needed. 2.   Follow-up Information       Follow up With Specialties Details Why Contact Info    Tanner Best MD Internal Medicine Physician Schedule an appointment as soon as possible for a visit in 3 days  [address] [address]  [phone]      [address] [place] [address] DEPT Emergency Medicine Go to   [address]  [phone]          3. Return to ED if worse    4. Current Discharge Medication List        START taking these medications    Details   !! acetaminophen (TYLENOL) 325 mg tablet Take 2 Tablets by mouth every six (6) hours as needed for Pain. Qty: 20 Tablet, Refills: 0  Start date: 12/7/2022      benzocaine-menthoL (Cepacol Sore Throat, vivien-men,) 15-2.6 mg lozg lozenge Take 1 Lozenge by mouth every two (2) hours as needed for Sore throat.   Qty: 50 Lozenge, Refills: 0  Start date: 12/7/2022      azithromycin (Zithromax TRI-BILLY) 500 mg tab Take 1 Tablet by mouth daily for 5 days. Qty: 5 Tablet, Refills: 0  Start date: 12/7/2022, End date: 12/12/2022      HYDROcodone-acetaminophen (Norco) 5-325 mg per tablet Take 1 Tablet by mouth every eight (8) hours as needed for Pain for up to 2 days. Max Daily Amount: 3 Tablets. Qty: 6 Tablet, Refills: 0  Start date: 12/7/2022, End date: 12/9/2022    Associated Diagnoses: Acute pharyngitis, unspecified etiology      bacitracin (BACITRACIN) 500 unit/gram oint Apply  to affected area three (3) times daily for 10 days. Apply to affected area  Qty: 28 g, Refills: 0  Start date: 12/7/2022, End date: 12/17/2022      ondansetron hcl (Zofran) 4 mg tablet Take 1 Tablet by mouth every eight (8) hours as needed for Nausea or Vomiting. Qty: 20 Tablet, Refills: 0  Start date: 12/7/2022      guaiFENesin (ROBITUSSIN) 100 mg/5 mL liquid Take 10 mL by mouth two (2) times daily as needed for Cough. Qty: 200 mL, Refills: 0  Start date: 12/7/2022       !! - Potential duplicate medications found. Please discuss with provider. CONTINUE these medications which have NOT CHANGED    Details   !! acetaminophen (TYLENOL) 325 mg tablet Take 1 Tab by mouth as needed. !! - Potential duplicate medications found. Please discuss with provider. Diagnosis     Clinical Impression:   1. Acute pharyngitis, unspecified etiology      Attestations:    Renetta Steward MD    Please note that this dictation was completed with [employer], the [employer] voice recognition software. Quite often unanticipated grammatical, syntax, homophones, and other interpretive errors are inadvertently transcribed by the computer software. Please disregard these errors. Please excuse any errors that have escaped final proofreading. Thank you.

## 2022-12-10 ENCOUNTER — HOSPITAL ENCOUNTER (EMERGENCY)
Age: 77
Discharge: HOME | End: 2022-12-10
Attending: EMERGENCY MEDICINE
Payer: MEDICARE

## 2022-12-10 VITALS
SYSTOLIC BLOOD PRESSURE: 149 MMHG | RESPIRATION RATE: 18 BRPM | OXYGEN SATURATION: 90 % | HEIGHT: 66 IN | DIASTOLIC BLOOD PRESSURE: 72 MMHG | TEMPERATURE: 98.7 F | BODY MASS INDEX: 25.07 KG/M2 | WEIGHT: 156 LBS | HEART RATE: 92 BPM

## 2022-12-10 DIAGNOSIS — S51.819A SKIN TEAR OF FOREARM WITHOUT COMPLICATION, INITIAL ENCOUNTER: Primary | ICD-10-CM

## 2022-12-10 PROCEDURE — 99282 EMERGENCY DEPT VISIT SF MDM: CPT

## 2022-12-10 NOTE — ED TRIAGE NOTES
GCS 15 pt stated that he was up attempting to walk to his bed when his \"feet got twisted up\" then he fell; skin tear noted to right forearm

## 2022-12-13 NOTE — ED PROVIDER NOTES
EMERGENCY DEPARTMENT HISTORY AND PHYSICAL EXAM      Date: 12/10/2022  Patient Name: Valerio Tarango    History of Presenting Illness     Chief Complaint   Patient presents with    Skin Problem       History Provided By: Patient    HPI: Valerio Tarango, 69 y.o. male presenting to the emergency department for evaluation of right forearm wound. Patient states that he fell and sustained a skin tear to his right forearm. Denies injury to the head or loss of consciousness. Denies weakness or paresthesias to the right upper extremity. There are no other complaints, changes, or physical findings at this time.     Past History     Past Medical History:  Past Medical History:   Diagnosis Date    Adenocarcinoma of left lung ([address] 8/24/2022    Anemia 10/16/2020    Cardiac pacemaker in situ 10/16/2020    CHF (congestive heart failure) ([address] 10/16/2020    COPD (chronic obstructive pulmonary disease) ([address] 10/16/2020    Coronary arteriosclerosis in native artery 10/16/2020    Drug-induced bullous pemphigoid 8/24/2022    End stage renal failure on dialysis ([address] 10/16/2020    Essential hypertension 10/16/2020    Excessive salivation 10/16/2020    H/O hernia repair 03/27/2013    Hip pain 10/16/2020    History of lobectomy of lung 10/16/2020    History of lobectomy of lung     Idiopathic pulmonary fibrosis ([address] 10/16/2020    Olecranon bursitis 10/16/2020    Pleuritic pain 10/16/2020    Sinus node dysfunction ([address] 10/16/2020       Past Surgical History:  Past Surgical History:   Procedure Laterality Date    HX COLONOSCOPY      HX PACEMAKER      HX PACEMAKER PLACEMENT      VASCULAR SURGERY PROCEDURE UNLIST         Family History:  Family History   Problem Relation Age of Onset    Diabetes Mother        Social History:  Social History     Tobacco Use    Smoking status: Former    Smokeless tobacco: Never    Tobacco comments:     QUIT 1970   Vaping Use    Vaping Use: Never used   Substance Use Topics    Alcohol use: Not Currently Drug use: Never       Allergies: Allergies   Allergen Reactions    Pcn [Penicillins] Nausea and Vomiting     ALSO CAUSES HA'S       PCP: Bety Muro MD    No current facility-administered medications on file prior to encounter. Current Outpatient Medications on File Prior to Encounter   Medication Sig Dispense Refill    acetaminophen (TYLENOL) 325 mg tablet Take 2 Tablets by mouth every six (6) hours as needed for Pain. 20 Tablet 0    benzocaine-menthoL (Cepacol Sore Throat, vivien-men,) 15-2.6 mg lozg lozenge Take 1 Lozenge by mouth every two (2) hours as needed for Sore throat. 50 Lozenge 0    [] azithromycin (Zithromax TRI-BILLY) 500 mg tab Take 1 Tablet by mouth daily for 5 days. 5 Tablet 0    bacitracin (BACITRACIN) 500 unit/gram oint Apply  to affected area three (3) times daily for 10 days. Apply to affected area 28 g 0    ondansetron hcl (Zofran) 4 mg tablet Take 1 Tablet by mouth every eight (8) hours as needed for Nausea or Vomiting. 20 Tablet 0    guaiFENesin (ROBITUSSIN) 100 mg/5 mL liquid Take 10 mL by mouth two (2) times daily as needed for Cough. 200 mL 0    minoxidiL (LONITEN) 2.5 mg tablet Take 1 Tablet by mouth every twelve (12) hours. 180 Tablet 2    labetaloL (NORMODYNE) 200 mg tablet Take 1 Tablet by mouth every eight (8) hours. 270 Tablet 2    isosorbide mononitrate ER (IMDUR) 30 mg tablet Take 1 Tablet by mouth daily. 90 Tablet 2    doxycycline (VIBRAMYCIN) 100 mg capsule Take 100 mg by mouth two (2) times a day. NIFEdipine ER (ADALAT CC) 60 mg ER tablet Take 1 Tablet by mouth two (2) times a day. 180 Tablet 2    cloNIDine HCL (CATAPRES) 0.1 mg tablet Take 1 Tablet by mouth two (2) times a day. 180 Tablet 2    hydrALAZINE (APRESOLINE) 25 mg tablet Take 1 Tablet by mouth every eight (8) hours.  270 Tablet 2    albuterol (PROVENTIL HFA, VENTOLIN HFA, PROAIR HFA) 90 mcg/actuation inhaler USE 2 INHALATIONS EVERY 4 HOURS AS NEEDED FOR WHEEZING, ASTHMA ATTACK 25.5 g 2 glycopyrrolate (ROBINUL) 1 mg tablet TAKE 1 TABLET TWICE A DAY AS NEEDED 180 Tab 3    calcium acetate,phosphat bind, (PHOSLYRA) 667 mg (169 mg calcium)/5 mL soln ORAL SOLUTION Take 1 Tab by mouth three (3) times daily. cyanocobalamin 1,000 mcg tablet Take 1 Tab by mouth daily. aspirin delayed-release 81 mg tablet Take 1 Tab by mouth daily. multivit-min/FA/lycopen/lutein (CENTRUM SILVER MEN PO) Take 1 Tab by mouth daily. ergocalciferol, vitamin d2, 10 mcg (400 unit) tab Take 1 Tablet by mouth daily. acetaminophen (TYLENOL) 325 mg tablet Take 1 Tab by mouth as needed. Review of Systems   Review of Systems   Constitutional:  Negative for chills and fever. HENT:  Negative for congestion and rhinorrhea. Eyes:  Negative for photophobia and visual disturbance. Respiratory:  Negative for cough and shortness of breath. Cardiovascular:  Negative for chest pain and palpitations. Gastrointestinal:  Negative for abdominal pain, diarrhea, nausea and vomiting. Genitourinary:  Negative for difficulty urinating and dysuria. Musculoskeletal:  Negative for arthralgias and myalgias. Skin:  Positive for wound. Negative for color change and rash. Neurological:  Negative for weakness and headaches. Psychiatric/Behavioral:  Negative for dysphoric mood and sleep disturbance. Physical Exam   Physical Exam  Constitutional:       General: He is not in acute distress. Appearance: Normal appearance. He is not ill-appearing. HENT:      Head: Normocephalic and atraumatic. Right Ear: External ear normal.      Left Ear: External ear normal.      Nose: Nose normal.      Mouth/Throat:      Mouth: Mucous membranes are moist.   Eyes:      Extraocular Movements: Extraocular movements intact. Conjunctiva/sclera: Conjunctivae normal.      Pupils: Pupils are equal, round, and reactive to light. Cardiovascular:      Rate and Rhythm: Normal rate and regular rhythm.       Pulses: Normal pulses. Pulmonary:      Effort: Pulmonary effort is normal. No respiratory distress. Breath sounds: Normal breath sounds. Abdominal:      General: Abdomen is flat. There is no distension. Musculoskeletal:         General: Normal range of motion. Cervical back: Normal range of motion. Skin:     General: Skin is warm and dry. Findings: Wound present. Comments: Moderate sized skin tear to the right forearm   Neurological:      General: No focal deficit present. Mental Status: He is alert and oriented to person, place, and time. Psychiatric:         Mood and Affect: Mood normal.         Behavior: Behavior normal.         Thought Content: Thought content normal.         Judgment: Judgment normal.       Lab and Diagnostic Study Results   Labs -   No results found for this or any previous visit (from the past 12 hour(s)). Radiologic Studies -   @lastxrresult@  CT Results  (Last 48 hours)      None          CXR Results  (Last 48 hours)      None            Medical Decision Making and ED Course   Differential Diagnosis & Medical Decision Making Provider Note:   61-year-old male presenting to the emergency department for evaluation of right forearm injury. Sustained mechanical fall from standing. Did not hit his head or lose consciousness. Exam consistent with moderate sized skin tear. There is no active bleeding. Patient states he is up-to-date on his tetanus. Will apply nonadherent dressing and wrapped in Kerlix. - I am the first provider for this patient. I reviewed the vital signs, available nursing notes, past medical history, past surgical history, family history and social history. The patients presenting problems have been discussed, and they are in agreement with the care plan formulated and outlined with them. I have encouraged them to ask questions as they arise throughout their visit. Vital Signs-Reviewed the patient's vital signs. No data found.     ED Course:          Procedures   Performed by: Tita Quiroga DO  Procedures      Disposition   Disposition: Condition stable  DC- Adult Discharges: All of the diagnostic tests were reviewed and questions answered. Diagnosis, care plan and treatment options were discussed. The patient understands the instructions and will follow up as directed. The patients results have been reviewed with them. They have been counseled regarding their diagnosis. The patient verbally convey understanding and agreement of the signs, symptoms, diagnosis, treatment and prognosis and additionally agrees to follow up as recommended with their PCP in 24 - 48 hours. They also agree with the care-plan and convey that all of their questions have been answered. I have also put together some discharge instructions for them that include: 1) educational information regarding their diagnosis, 2) how to care for their diagnosis at home, as well a 3) list of reasons why they would want to return to the ED prior to their follow-up appointment, should their condition change. DC-The patient was given verbal follow-up instructions    DISCHARGE PLAN:  1. Cannot display discharge medications since this patient is not currently admitted. 2.   Follow-up Information       Follow up With Specialties Details Why Contact Info    Nereyda Bradley MD Internal Medicine Physician Schedule an appointment as soon as possible for a visit   [address] [place] [address],[address], [facility]  [phone]            3. Return to ED if worse   4. Discharge Medication List as of 12/10/2022  7:16 PM         Remove if admitted/transferred    Diagnosis/Clinical Impression     Clinical Impression:   1. Skin tear of forearm without complication, initial encounter        Attestations: Adrian Bryson DO, am the primary clinician of record. Please note that this dictation was completed with [email or web address], the [employer] voice recognition software.   Quite often unanticipated grammatical, syntax, homophones, and other interpretive errors are inadvertently transcribed by the computer software. Please disregard these errors. Please excuse any errors that have escaped final proofreading. Thank you.

## 2023-01-03 ENCOUNTER — HOSPITAL ENCOUNTER (EMERGENCY)
Age: 78
Discharge: HOME | End: 2023-01-03
Attending: EMERGENCY MEDICINE
Payer: MEDICARE

## 2023-01-03 VITALS
HEIGHT: 66 IN | BODY MASS INDEX: 25.07 KG/M2 | OXYGEN SATURATION: 98 % | HEART RATE: 72 BPM | SYSTOLIC BLOOD PRESSURE: 102 MMHG | DIASTOLIC BLOOD PRESSURE: 51 MMHG | RESPIRATION RATE: 18 BRPM | TEMPERATURE: 98 F | WEIGHT: 156 LBS

## 2023-01-03 DIAGNOSIS — R23.8 BULLA: Primary | ICD-10-CM

## 2023-01-03 PROCEDURE — 99283 EMERGENCY DEPT VISIT LOW MDM: CPT

## 2023-01-03 PROCEDURE — 74011636637 HC RX REV CODE- 636/637: Performed by: EMERGENCY MEDICINE

## 2023-01-03 PROCEDURE — A9270 NON-COVERED ITEM OR SERVICE: HCPCS | Performed by: EMERGENCY MEDICINE

## 2023-01-03 RX ORDER — PREDNISONE 10 MG/1
TABLET ORAL
Qty: 42 TABLET | Refills: 0 | Status: SHIPPED | OUTPATIENT
Start: 2023-01-03

## 2023-01-03 RX ORDER — PREDNISONE 20 MG/1
60 TABLET ORAL
Status: COMPLETED | OUTPATIENT
Start: 2023-01-03 | End: 2023-01-03

## 2023-01-03 RX ADMIN — PREDNISONE 60 MG: 20 TABLET ORAL at 19:49

## 2023-01-04 NOTE — ED PROVIDER NOTES
EMERGENCY DEPARTMENT HISTORY AND PHYSICAL EXAM      Date: 1/3/2023  Patient Name: Trinity Mayorga    History of Presenting Illness     Chief Complaint   Patient presents with    Abscess     History Provided By: Patient    HPI: Trinity Mayorga, 69 y.o. male with history of CHF, COPD, and lung cancer who presents with swollen area on his left leg and abdomen. States he has noticed some last few days. He did have a larger one that burst on his leg. This was clear. He states that he had an allergic reaction in the past to his chemotherapy medicine that caused significant bulla. They stopped the treatment due to this. There are no other complaints, changes, or physical findings at this time. PCP: Megha Marin MD    Current Outpatient Medications   Medication Sig Dispense Refill    predniSONE (DELTASONE) 10 mg tablet Take daily as instructed. This is prescribed as a tapered dose. Take all tablets for the day in the morning with food orally. The dosage will be reduced over a period of 12 days. Day 1 take 6 tablets; Day 2 take 6 tablets; Day 3 take 5 tablets; Day 4 take 5 tablets; Day 5 take 4 tablets; Day 6 take 4 tablets; Day 7 take 3 tablets; Day 8 take 3 tablets; Day 9 take 2 platelets; Day 10 take 2 tablets; Day 11 take 1 tablet; Day 12 take 1 tablet 42 Tablet 0    acetaminophen (TYLENOL) 325 mg tablet Take 2 Tablets by mouth every six (6) hours as needed for Pain. 20 Tablet 0    benzocaine-menthoL (Cepacol Sore Throat, vivien-men,) 15-2.6 mg lozg lozenge Take 1 Lozenge by mouth every two (2) hours as needed for Sore throat. 50 Lozenge 0    ondansetron hcl (Zofran) 4 mg tablet Take 1 Tablet by mouth every eight (8) hours as needed for Nausea or Vomiting. 20 Tablet 0    guaiFENesin (ROBITUSSIN) 100 mg/5 mL liquid Take 10 mL by mouth two (2) times daily as needed for Cough. 200 mL 0    minoxidiL (LONITEN) 2.5 mg tablet Take 1 Tablet by mouth every twelve (12) hours.  180 Tablet 2    labetaloL (NORMODYNE) 200 mg tablet Take 1 Tablet by mouth every eight (8) hours. 270 Tablet 2    isosorbide mononitrate ER (IMDUR) 30 mg tablet Take 1 Tablet by mouth daily. 90 Tablet 2    doxycycline (VIBRAMYCIN) 100 mg capsule Take 100 mg by mouth two (2) times a day. NIFEdipine ER (ADALAT CC) 60 mg ER tablet Take 1 Tablet by mouth two (2) times a day. 180 Tablet 2    cloNIDine HCL (CATAPRES) 0.1 mg tablet Take 1 Tablet by mouth two (2) times a day. 180 Tablet 2    hydrALAZINE (APRESOLINE) 25 mg tablet Take 1 Tablet by mouth every eight (8) hours. 270 Tablet 2    albuterol (PROVENTIL HFA, VENTOLIN HFA, PROAIR HFA) 90 mcg/actuation inhaler USE 2 INHALATIONS EVERY 4 HOURS AS NEEDED FOR WHEEZING, ASTHMA ATTACK 25.5 g 2    glycopyrrolate (ROBINUL) 1 mg tablet TAKE 1 TABLET TWICE A DAY AS NEEDED 180 Tab 3    calcium acetate,phosphat bind, (PHOSLYRA) 667 mg (169 mg calcium)/5 mL soln ORAL SOLUTION Take 1 Tab by mouth three (3) times daily. cyanocobalamin 1,000 mcg tablet Take 1 Tab by mouth daily. aspirin delayed-release 81 mg tablet Take 1 Tab by mouth daily. multivit-min/FA/lycopen/lutein (CENTRUM SILVER MEN PO) Take 1 Tab by mouth daily. ergocalciferol, vitamin d2, 10 mcg (400 unit) tab Take 1 Tablet by mouth daily. acetaminophen (TYLENOL) 325 mg tablet Take 1 Tab by mouth as needed.          Past History   Past Medical History:  Past Medical History:   Diagnosis Date    Adenocarcinoma of left lung ([address] 8/24/2022    Anemia 10/16/2020    Cardiac pacemaker in situ 10/16/2020    CHF (congestive heart failure) ([address] 10/16/2020    COPD (chronic obstructive [facility] disease) [facility] 10/16/2020    Coronary arteriosclerosis in native artery 10/16/2020    Drug-induced bullous pemphigoid 8/24/2022    End stage renal failure on dialysis ([address] 10/16/2020    Essential hypertension 10/16/2020    Excessive salivation 10/16/2020    H/O hernia repair 03/27/2013    Hip pain 10/16/2020    History of lobectomy of lung 10/16/2020    History of lobectomy of lung     Idiopathic pulmonary fibrosis ([facility] 10/16/2020    Olecranon bursitis 10/16/2020    Pleuritic pain 10/16/2020    Sinus node dysfunction ([facility] 10/16/2020        Past Surgical History:  Past Surgical History:   Procedure Laterality Date    HX COLONOSCOPY      HX PACEMAKER      HX PACEMAKER PLACEMENT      VASCULAR SURGERY PROCEDURE UNLIST         Family History:  Family History   Problem Relation Age of Onset    Diabetes Mother        Social History:  Social History     Tobacco Use    Smoking status: Former    Smokeless tobacco: Never    Tobacco comments:     QUIT 1970   Vaping Use    Vaping Use: Never used   Substance Use Topics    Alcohol use: Not Currently    Drug use: Never       Allergies: Allergies   Allergen Reactions    Pcn [Penicillins] Nausea and Vomiting     ALSO CAUSES HA'S        Review of Systems   Review of Systems   Constitutional:  Negative for fever. HENT:  Negative for congestion. Eyes:  Negative for visual disturbance. Respiratory:  Negative for shortness of breath. Cardiovascular:  Negative for chest pain. Gastrointestinal:  Negative for abdominal pain. Genitourinary:  Negative for dysuria. Musculoskeletal:  Negative for arthralgias. Skin:  Positive for rash. Neurological:  Negative for headaches. Physical Exam   Constitutional: No acute distress. Well-nourished. Skin: No rash. There is a small mole on the left upper thigh measuring about 1 cm that appears to have blood within it. There is another small bulla on the abdomen with a small amount of blood that is very tiny. No surrounding erythema. Nontender. ENT: No rhinorrhea. No cough. Head is normocephalic and atraumatic. Eye: No proptosis or conjunctival injections. Respiratory: No apparent respiratory distress. Gastrointestinal: Nondistended. Musculoskeletal: No obvious bony deformities. Psychiatric: Cooperative. Appropriate mood and affect.      Lab and Diagnostic Study Results   Labs -   No results found for this or any previous visit (from the past 12 hour(s)). Radiologic Studies -   [unfilled]  CT Results  (Last 48 hours)      None          CXR Results  (Last 48 hours)      None            Medical Decision Making and ED Course   - I am the first and primary provider for this patient AND AM THE PRIMARY PROVIDER OF RECORD. I reviewed the vital signs, available nursing notes, past medical history, past surgical history, family history and social history. - Initial assessment performed. The patients presenting problems have been discussed, and the staff are in agreement with the care plan formulated and outlined with them. I have encouraged them to ask questions as they arise throughout their visit. Vital Signs-Reviewed the patient's vital signs. Patient Vitals for the past 12 hrs:   Temp Pulse Resp BP SpO2   01/03/23 1908 -- -- -- (!) 102/51 --   01/03/23 1619 98 °F (36.7 °C) 72 18 (!) 82/52 98 %     MDM  Differential diagnosis: Hemorrhagic bulla, bulla, autoimmune reaction, skin blister  Initial plan/workup: Feel the need for labs or tests. He is not on warfarin. I do not think he is having any severe bleeding. He denies any bloody stools or vomiting blood. Plan/disposition: I will treat this patient with a steroid taper for 12 days. I recommended follow-up with his PCP. Unclear etiology of his symptoms however do not appear concerning enough to order labs or other test.  There are no oral lesions. Discharged home. Disposition     Disposition: Discharged    DISCHARGE PLAN:  1. Current Discharge Medication List        CONTINUE these medications which have NOT CHANGED    Details   !! acetaminophen (TYLENOL) 325 mg tablet Take 2 Tablets by mouth every six (6) hours as needed for Pain.   Qty: 20 Tablet, Refills: 0      benzocaine-menthoL (Cepacol Sore Throat, vivien-men,) 15-2.6 mg lozg lozenge Take 1 Lozenge by mouth every two (2) hours as needed for Sore throat. Qty: 50 Lozenge, Refills: 0      ondansetron hcl (Zofran) 4 mg tablet Take 1 Tablet by mouth every eight (8) hours as needed for Nausea or Vomiting. Qty: 20 Tablet, Refills: 0      guaiFENesin (ROBITUSSIN) 100 mg/5 mL liquid Take 10 mL by mouth two (2) times daily as needed for Cough. Qty: 200 mL, Refills: 0      minoxidiL (LONITEN) 2.5 mg tablet Take 1 Tablet by mouth every twelve (12) hours. Qty: 180 Tablet, Refills: 2      labetaloL (NORMODYNE) 200 mg tablet Take 1 Tablet by mouth every eight (8) hours. Qty: 270 Tablet, Refills: 2      isosorbide mononitrate ER (IMDUR) 30 mg tablet Take 1 Tablet by mouth daily. Qty: 90 Tablet, Refills: 2      doxycycline (VIBRAMYCIN) 100 mg capsule Take 100 mg by mouth two (2) times a day. NIFEdipine ER (ADALAT CC) 60 mg ER tablet Take 1 Tablet by mouth two (2) times a day. Qty: 180 Tablet, Refills: 2      cloNIDine HCL (CATAPRES) 0.1 mg tablet Take 1 Tablet by mouth two (2) times a day. Qty: 180 Tablet, Refills: 2      hydrALAZINE (APRESOLINE) 25 mg tablet Take 1 Tablet by mouth every eight (8) hours. Qty: 270 Tablet, Refills: 2      albuterol (PROVENTIL HFA, VENTOLIN HFA, PROAIR HFA) 90 mcg/actuation inhaler USE 2 INHALATIONS EVERY 4 HOURS AS NEEDED FOR WHEEZING, ASTHMA ATTACK  Qty: 25.5 g, Refills: 2      glycopyrrolate (ROBINUL) 1 mg tablet TAKE 1 TABLET TWICE A DAY AS NEEDED  Qty: 180 Tab, Refills: 3      calcium acetate,phosphat bind, (PHOSLYRA) 667 mg (169 mg calcium)/5 mL soln ORAL SOLUTION Take 1 Tab by mouth three (3) times daily. cyanocobalamin 1,000 mcg tablet Take 1 Tab by mouth daily. aspirin delayed-release 81 mg tablet Take 1 Tab by mouth daily. multivit-min/FA/lycopen/lutein (CENTRUM SILVER MEN PO) Take 1 Tab by mouth daily. ergocalciferol, vitamin d2, 10 mcg (400 unit) tab Take 1 Tablet by mouth daily. !! acetaminophen (TYLENOL) 325 mg tablet Take 1 Tab by mouth as needed.        !! - Potential duplicate medications found. Please discuss with provider. 2.   Follow-up Information       Follow up With Specialties Details Why [address]    [facility] EMERGENCY DEPT Emergency Medicine Go today As soon as possible if symptoms worsen [address] [facility] [address] [phone]    Primary care doctor  Schedule an appointment as soon as possible for a visit in 3 days            3. Return to ED if worse   4. Current Discharge Medication List        START taking these medications    Details   predniSONE (DELTASONE) 10 mg tablet Take daily as instructed. This is prescribed as a tapered dose. Take all tablets for the day in the morning with food orally. The dosage will be reduced over a period of 12 days. Day 1 take 6 tablets; Day 2 take 6 tablets; Day 3 take 5 tablets; Day 4 take 5 tablets; Day 5 take 4 tablets; Day 6 take 4 tablets; Day 7 take 3 tablets; Day 8 take 3 tablets; Day 9 take 2 platelets; Day 10 take 2 tablets; Day 11 take 1 tablet; Day 12 take 1 tablet  Qty: 42 Tablet, Refills: 0  Start date: 1/3/2023              Diagnosis/Clinical Impression     Clinical Impression:   1. Bulla         Attestations:  Pippa Lynne, DO    Please note that this dictation was completed with [employer], the computer voice recognition software. Quite often unanticipated grammatical, syntax, homophones, and other interpretive errors are inadvertently transcribed by the computer software. Please disregard these errors. Please excuse any errors that have escaped final proofreading. Thank you.

## 2023-01-10 ENCOUNTER — HOSPITAL ENCOUNTER (EMERGENCY)
Age: 78
Discharge: HOME | End: 2023-01-10
Attending: STUDENT IN AN ORGANIZED HEALTH CARE EDUCATION/TRAINING PROGRAM
Payer: MEDICARE

## 2023-01-10 ENCOUNTER — APPOINTMENT (OUTPATIENT)
Dept: GENERAL RADIOLOGY | Age: 78
End: 2023-01-10
Attending: STUDENT IN AN ORGANIZED HEALTH CARE EDUCATION/TRAINING PROGRAM
Payer: MEDICARE

## 2023-01-10 VITALS
SYSTOLIC BLOOD PRESSURE: 102 MMHG | HEART RATE: 61 BPM | RESPIRATION RATE: 17 BRPM | BODY MASS INDEX: 22.5 KG/M2 | OXYGEN SATURATION: 100 % | HEIGHT: 66 IN | WEIGHT: 140 LBS | DIASTOLIC BLOOD PRESSURE: 53 MMHG | TEMPERATURE: 97.7 F

## 2023-01-10 DIAGNOSIS — I95.89 HYPOTENSION DUE TO HYPOVOLEMIA: Primary | ICD-10-CM

## 2023-01-10 DIAGNOSIS — E86.1 HYPOTENSION DUE TO HYPOVOLEMIA: Primary | ICD-10-CM

## 2023-01-10 LAB
ALBUMIN SERPL-MCNC: 2.8 G/DL (ref 3.5–5)
ALBUMIN/GLOB SERPL: 0.6 (ref 1.1–2.2)
ALP SERPL-CCNC: 109 U/L (ref 45–117)
ALT SERPL-CCNC: 17 U/L (ref 12–78)
ANION GAP SERPL CALC-SCNC: 7 MMOL/L (ref 5–15)
AST SERPL W P-5'-P-CCNC: 13 U/L (ref 15–37)
BASOPHILS # BLD: 0 K/UL (ref 0–0.1)
BASOPHILS NFR BLD: 0 % (ref 0–1)
BILIRUB SERPL-MCNC: 0.4 MG/DL (ref 0.2–1)
BUN SERPL-MCNC: 21 MG/DL (ref 6–20)
BUN/CREAT SERPL: 3 (ref 12–20)
CA-I BLD-MCNC: 8.4 MG/DL (ref 8.5–10.1)
CHLORIDE SERPL-SCNC: 100 MMOL/L (ref 97–108)
CO2 SERPL-SCNC: 28 MMOL/L (ref 21–32)
CREAT SERPL-MCNC: 6.1 MG/DL (ref 0.7–1.3)
DIFFERENTIAL METHOD BLD: ABNORMAL
EOSINOPHIL # BLD: 0.2 K/UL (ref 0–0.4)
EOSINOPHIL NFR BLD: 3 % (ref 0–7)
ERYTHROCYTE [DISTWIDTH] IN BLOOD BY AUTOMATED COUNT: 14.9 % (ref 11.5–14.5)
GLOBULIN SER CALC-MCNC: 4.4 G/DL (ref 2–4)
GLUCOSE SERPL-MCNC: 117 MG/DL (ref 65–100)
HCT VFR BLD AUTO: 30.5 % (ref 36.6–50.3)
HGB BLD-MCNC: 10 G/DL (ref 12.1–17)
IMM GRANULOCYTES # BLD AUTO: 0.1 K/UL (ref 0–0.04)
IMM GRANULOCYTES NFR BLD AUTO: 1 % (ref 0–0.5)
LACTATE SERPL-SCNC: 1.5 MMOL/L (ref 0.4–2)
LYMPHOCYTES # BLD: 0.9 K/UL (ref 0.8–3.5)
LYMPHOCYTES NFR BLD: 16 % (ref 12–49)
MCH RBC QN AUTO: 32.3 PG (ref 26–34)
MCHC RBC AUTO-ENTMCNC: 32.8 G/DL (ref 30–36.5)
MCV RBC AUTO: 98.4 FL (ref 80–99)
MONOCYTES # BLD: 0.6 K/UL (ref 0–1)
MONOCYTES NFR BLD: 11 % (ref 5–13)
NEUTS SEG # BLD: 4 K/UL (ref 1.8–8)
NEUTS SEG NFR BLD: 69 % (ref 32–75)
NRBC # BLD: 0.02 K/UL (ref 0–0.01)
NRBC BLD-RTO: 0.3 PER 100 WBC
PLATELET # BLD AUTO: 249 K/UL (ref 150–400)
PMV BLD AUTO: 9.1 FL (ref 8.9–12.9)
POTASSIUM SERPL-SCNC: 3.6 MMOL/L (ref 3.5–5.1)
PROT SERPL-MCNC: 7.2 G/DL (ref 6.4–8.2)
RBC # BLD AUTO: 3.1 M/UL (ref 4.1–5.7)
SODIUM SERPL-SCNC: 135 MMOL/L (ref 136–145)
WBC # BLD AUTO: 5.8 K/UL (ref 4.1–11.1)

## 2023-01-10 PROCEDURE — 71045 X-RAY EXAM CHEST 1 VIEW: CPT

## 2023-01-10 PROCEDURE — 83605 ASSAY OF LACTIC ACID: CPT

## 2023-01-10 PROCEDURE — 74011250636 HC RX REV CODE- 250/636: Performed by: STUDENT IN AN ORGANIZED HEALTH CARE EDUCATION/TRAINING PROGRAM

## 2023-01-10 PROCEDURE — 93005 ELECTROCARDIOGRAM TRACING: CPT

## 2023-01-10 PROCEDURE — 36415 COLL VENOUS BLD VENIPUNCTURE: CPT

## 2023-01-10 PROCEDURE — 99284 EMERGENCY DEPT VISIT MOD MDM: CPT

## 2023-01-10 PROCEDURE — 87040 BLOOD CULTURE FOR BACTERIA: CPT

## 2023-01-10 PROCEDURE — 85025 COMPLETE CBC W/AUTO DIFF WBC: CPT

## 2023-01-10 PROCEDURE — 80053 COMPREHEN METABOLIC PANEL: CPT

## 2023-01-10 RX ADMIN — SODIUM CHLORIDE 250 ML: 9 INJECTION, SOLUTION INTRAVENOUS at 19:36

## 2023-01-10 NOTE — ED TRIAGE NOTES
Pt arrives to ED with family. Pt reports hypotension. Pt has lung cancer and does dialysis - received dialysis today.  80/44 in triage

## 2023-01-11 ENCOUNTER — TELEPHONE (OUTPATIENT)
Dept: INTERNAL MEDICINE CLINIC | Age: 78
End: 2023-01-11

## 2023-01-11 NOTE — ED NOTES
Rounded on pt at this time, pt alert and oriented x4, family member at bedside, covered in a warm blanket, blood pressure improving

## 2023-01-11 NOTE — TELEPHONE ENCOUNTER
The patient's family member called and stated that the patient's BP has been running low. She stated that he went to the ER yesterday and was seen for hypotension. She wanted to follow up with this with the PCP. They called the oncologist and the oncologist told the patient to stop taking the BP medication. The patient has an appointment scheduled for Thursday to be seen for Hypotension.

## 2023-01-11 NOTE — ED PROVIDER NOTES
EMERGENCY DEPARTMENT HISTORY AND PHYSICAL EXAM      Date: 1/10/2023  Patient Name: Apollo Harris    History of Presenting Illness     Chief Complaint   Patient presents with    Hypotension       History Provided By: Patient    HPI: Apollo Harris, 69 y.o. male presents for evaluation of low blood pressure. Patient states that his pressures have been low over the last 24 to 48 hours. He completed dialysis today, having 3.2 L removed. He has continued to take his blood pressure medicines as prescribed, but has not taken any after his morning dose this morning. He was seen at a clinic appointment before coming here, where he was found to be hypotensive. He was given 500 cc of normal saline. He denies any fevers or chills, nausea or vomiting, dyspnea, cough, no other sick symptoms. There are no other complaints, changes, or physical findings at this time.     Past History     Past Medical History:  Past Medical History:   Diagnosis Date    Adenocarcinoma of left lung ([address] 8/24/2022    Anemia 10/16/2020    Cardiac pacemaker in situ 10/16/2020    CHF (congestive heart failure) ([address] 10/16/2020    COPD (chronic obstructive pulmonary disease) ([address] 10/16/2020    Coronary arteriosclerosis in native artery 10/16/2020    Drug-induced bullous pemphigoid 8/24/2022    End stage renal failure on dialysis ([address] 10/16/2020    Essential hypertension 10/16/2020    Excessive salivation 10/16/2020    H/O hernia repair 03/27/2013    Hip pain 10/16/2020    History of lobectomy of lung 10/16/2020    History of lobectomy of lung     Idiopathic pulmonary fibrosis ([address] 10/16/2020    Olecranon bursitis 10/16/2020    Pleuritic pain 10/16/2020    Sinus node dysfunction ([address] 10/16/2020       Past Surgical History:  Past Surgical History:   Procedure Laterality Date    HX COLONOSCOPY      HX PACEMAKER      HX PACEMAKER PLACEMENT      VASCULAR SURGERY PROCEDURE UNLIST         Family History:  Family History   Problem Relation Age of Onset    Diabetes Mother        Social History:  Social History     Tobacco Use    Smoking status: Former    Smokeless tobacco: Never    Tobacco comments:     QUIT 1970   Vaping Use    Vaping Use: Never used   Substance Use Topics    Alcohol use: Not Currently    Drug use: Never       Allergies: Allergies   Allergen Reactions    Pcn [Penicillins] Nausea and Vomiting     ALSO CAUSES HA'S       PCP: Dainel Pandya MD    No current facility-administered medications on file prior to encounter. Current Outpatient Medications on File Prior to Encounter   Medication Sig Dispense Refill    predniSONE (DELTASONE) 10 mg tablet Take daily as instructed. This is prescribed as a tapered dose. Take all tablets for the day in the morning with food orally. The dosage will be reduced over a period of 12 days. Day 1 take 6 tablets; Day 2 take 6 tablets; Day 3 take 5 tablets; Day 4 take 5 tablets; Day 5 take 4 tablets; Day 6 take 4 tablets; Day 7 take 3 tablets; Day 8 take 3 tablets; Day 9 take 2 platelets; Day 10 take 2 tablets; Day 11 take 1 tablet; Day 12 take 1 tablet 42 Tablet 0    acetaminophen (TYLENOL) 325 mg tablet Take 2 Tablets by mouth every six (6) hours as needed for Pain. 20 Tablet 0    benzocaine-menthoL (Cepacol Sore Throat, vivien-men,) 15-2.6 mg lozg lozenge Take 1 Lozenge by mouth every two (2) hours as needed for Sore throat. 50 Lozenge 0    ondansetron hcl (Zofran) 4 mg tablet Take 1 Tablet by mouth every eight (8) hours as needed for Nausea or Vomiting. 20 Tablet 0    guaiFENesin (ROBITUSSIN) 100 mg/5 mL liquid Take 10 mL by mouth two (2) times daily as needed for Cough. 200 mL 0    minoxidiL (LONITEN) 2.5 mg tablet Take 1 Tablet by mouth every twelve (12) hours. 180 Tablet 2    labetaloL (NORMODYNE) 200 mg tablet Take 1 Tablet by mouth every eight (8) hours. 270 Tablet 2    isosorbide mononitrate ER (IMDUR) 30 mg tablet Take 1 Tablet by mouth daily.  90 Tablet 2    doxycycline (VIBRAMYCIN) 100 mg capsule Take 100 mg by mouth two (2) times a day. NIFEdipine ER (ADALAT CC) 60 mg ER tablet Take 1 Tablet by mouth two (2) times a day. 180 Tablet 2    cloNIDine HCL (CATAPRES) 0.1 mg tablet Take 1 Tablet by mouth two (2) times a day. 180 Tablet 2    hydrALAZINE (APRESOLINE) 25 mg tablet Take 1 Tablet by mouth every eight (8) hours. 270 Tablet 2    albuterol (PROVENTIL HFA, VENTOLIN HFA, PROAIR HFA) 90 mcg/actuation inhaler USE 2 INHALATIONS EVERY 4 HOURS AS NEEDED FOR WHEEZING, ASTHMA ATTACK 25.5 g 2    glycopyrrolate (ROBINUL) 1 mg tablet TAKE 1 TABLET TWICE A DAY AS NEEDED 180 Tab 3    calcium acetate,phosphat bind, (PHOSLYRA) 667 mg (169 mg calcium)/5 mL soln ORAL SOLUTION Take 1 Tab by mouth three (3) times daily. cyanocobalamin 1,000 mcg tablet Take 1 Tab by mouth daily. aspirin delayed-release 81 mg tablet Take 1 Tab by mouth daily. multivit-min/FA/lycopen/lutein (CENTRUM SILVER MEN PO) Take 1 Tab by mouth daily. ergocalciferol, vitamin d2, 10 mcg (400 unit) tab Take 1 Tablet by mouth daily. acetaminophen (TYLENOL) 325 mg tablet Take 1 Tab by mouth as needed. Review of Systems   Review of Systems   Constitutional:  Negative for fever. HENT:  Negative for congestion. Respiratory:  Negative for cough and shortness of breath. Cardiovascular:  Negative for chest pain. Gastrointestinal:  Negative for abdominal pain, constipation, nausea and vomiting. Genitourinary:  Negative for dysuria. Musculoskeletal:  Negative for arthralgias and myalgias. Skin:  Negative for rash. Allergic/Immunologic: Negative for immunocompromised state. Neurological:  Negative for syncope. Psychiatric/Behavioral:  Negative for confusion. Physical Exam   Physical Exam  Vitals reviewed. Constitutional:       General: He is not in acute distress. Appearance: He is not toxic-appearing. HENT:      Head: Normocephalic and atraumatic.    Eyes:      Extraocular Movements: Extraocular movements intact. Pupils: Pupils are equal, round, and reactive to light. Cardiovascular:      Rate and Rhythm: Normal rate and regular rhythm. Heart sounds: Normal heart sounds. Pulmonary:      Effort: Pulmonary effort is normal.      Breath sounds: Normal breath sounds. Abdominal:      Palpations: Abdomen is soft. Tenderness: There is no abdominal tenderness. Musculoskeletal:      Right lower leg: No edema. Left lower leg: No edema. Skin:     General: Skin is warm and dry. Neurological:      General: No focal deficit present. Mental Status: He is alert. Psychiatric:         Mood and Affect: Mood normal.         Behavior: Behavior normal.        Lab and Diagnostic Study Results   Labs -     Recent Results (from the past 12 hour(s))   CBC WITH AUTOMATED DIFF    Collection Time: 01/10/23  6:46 PM   Result Value Ref Range    WBC 5.8 4.1 - 11.1 K/uL    RBC 3.10 (L) 4.10 - 5.70 M/uL    HGB 10.0 (L) 12.1 - 17.0 g/dL    HCT 30.5 (L) 36.6 - 50.3 %    MCV 98.4 80.0 - 99.0 FL    MCH 32.3 26.0 - 34.0 PG    MCHC 32.8 30.0 - 36.5 g/dL    RDW 14.9 (H) 11.5 - 14.5 %    PLATELET 908 910 - 466 K/uL    MPV 9.1 8.9 - 12.9 FL    NRBC 0.3 (H) 0.0  WBC    ABSOLUTE NRBC 0.02 (H) 0.00 - 0.01 K/uL    NEUTROPHILS 69 32 - 75 %    LYMPHOCYTES 16 12 - 49 %    MONOCYTES 11 5 - 13 %    EOSINOPHILS 3 0 - 7 %    BASOPHILS 0 0 - 1 %    IMMATURE GRANULOCYTES 1 (H) 0 - 0.5 %    ABS. NEUTROPHILS 4.0 1.8 - 8.0 K/UL    ABS. LYMPHOCYTES 0.9 0.8 - 3.5 K/UL    ABS. MONOCYTES 0.6 0.0 - 1.0 K/UL    ABS. EOSINOPHILS 0.2 0.0 - 0.4 K/UL    ABS. BASOPHILS 0.0 0.0 - 0.1 K/UL    ABS. IMM.  GRANS. 0.1 (H) 0.00 - 0.04 K/UL    DF AUTOMATED     METABOLIC PANEL, COMPREHENSIVE    Collection Time: 01/10/23  6:46 PM   Result Value Ref Range    Sodium 135 (L) 136 - 145 mmol/L    Potassium 3.6 3.5 - 5.1 mmol/L    Chloride 100 97 - 108 mmol/L    CO2 28 21 - 32 mmol/L    Anion gap 7 5 - 15 mmol/L    Glucose 117 (H) 65 - 100 mg/dL    BUN 21 (H) 6 - 20 mg/dL    Creatinine 6.10 (H) 0.70 - 1.30 mg/dL    BUN/Creatinine ratio 3 (L) 12 - 20      eGFR 9 (L) >60 ml/min/1.73m2    Calcium 8.4 (L) 8.5 - 10.1 mg/dL    Bilirubin, total 0.4 0.2 - 1.0 mg/dL    AST (SGOT) 13 (L) 15 - 37 U/L    ALT (SGPT) 17 12 - 78 U/L    Alk. phosphatase 109 45 - 117 U/L    Protein, total 7.2 6.4 - 8.2 g/dL    Albumin 2.8 (L) 3.5 - 5.0 g/dL    Globulin 4.4 (H) 2.0 - 4.0 g/dL    A-G Ratio 0.6 (L) 1.1 - 2.2     LACTIC ACID    Collection Time: 01/10/23  6:46 PM   Result Value Ref Range    Lactic acid 1.5 0.4 - 2.0 mmol/L       Radiologic Studies -   @lastxrresult@  CT Results  (Last 48 hours)      None          CXR Results  (Last 48 hours)                 01/10/23 1904  XR CHEST PORT Final result    Impression:  No evidence of acute cardiopulmonary process. Narrative:  INDICATION:  eval for pneumonia        COMPARISON: November 2017       FINDINGS: Single AP portable view of the chest obtained at 1846 demonstrates a   stable cardiomediastinal silhouette. There is chronic cardiomegaly. There is a   right-sided pacer device. The lungs are clear bilaterally. No osseous   abnormalities are seen. Medical Decision Making and ED Course   Differential Diagnosis & Medical Decision Making Provider Note:   21-year-old male presents for evaluation of hypotension. Initially hypotensive with normal heart rate. Suspect hypovolemia following dialysis today as well as likely component of his extensive antihypertensive regimen being too effective. He does not appear systemically ill, but will evaluate with CBC, BMP, lactate. Blood culture sent. Chest x-ray to rule out pneumonia. Initial treatment with IV fluid bolus. - I am the first provider for this patient. I reviewed the vital signs, available nursing notes, past medical history, past surgical history, family history and social history.  The patients presenting problems have been discussed, and they are in agreement with the care plan formulated and outlined with them. I have encouraged them to ask questions as they arise throughout their visit. Vital Signs-Reviewed the patient's vital signs. Patient Vitals for the past 12 hrs:   Temp Pulse Resp BP SpO2   01/10/23 1930 -- 60 16 (!) 93/45 99 %   01/10/23 1901 -- 65 19 92/62 100 %   01/10/23 1823 97.7 °F (36.5 °C) 61 19 (!) 80/44 99 %       ED Course:   ED Course as of 01/10/23 2037   Tue Anurag 10, 2023   1900 ECG performed at 1854 and interpreted by me shows atrial paced rhythm with prolonged AV conduction. Interval 216, no ST elevation or depression [BQ]   2033 Blood pressure improving with IV fluids. Lab work benign. Patient recommended to follow-up with his PCP for management of his antihypertensive regimen. Also recommended to not take his blood pressure medicines if his pressures at home are less than 120/80. [BQ]      ED Course User Index  [BQ] Anay Haider MD         Procedures   Performed by: Lexa Ramsay MD  Procedures      Disposition   Disposition: DC- Adult Discharges: All of the diagnostic tests were reviewed and questions answered. Diagnosis, care plan and treatment options were discussed. The patient understands the instructions and will follow up as directed. The patients results have been reviewed with them. They have been counseled regarding their diagnosis. The patient verbally convey understanding and agreement of the signs, symptoms, diagnosis, treatment and prognosis and additionally agrees to follow up as recommended with their PCP in 24 - 48 hours. They also agree with the care-plan and convey that all of their questions have been answered.   I have also put together some discharge instructions for them that include: 1) educational information regarding their diagnosis, 2) how to care for their diagnosis at home, as well a 3) list of reasons why they would want to return to the ED prior to their follow-up appointment, should their condition change. DISCHARGE PLAN:  1. Current Discharge Medication List        CONTINUE these medications which have NOT CHANGED    Details   predniSONE (DELTASONE) 10 mg tablet Take daily as instructed. This is prescribed as a tapered dose. Take all tablets for the day in the morning with food orally. The dosage will be reduced over a period of 12 days. Day 1 take 6 tablets; Day 2 take 6 tablets; Day 3 take 5 tablets; Day 4 take 5 tablets; Day 5 take 4 tablets; Day 6 take 4 tablets; Day 7 take 3 tablets; Day 8 take 3 tablets; Day 9 take 2 platelets; Day 10 take 2 tablets; Day 11 take 1 tablet; Day 12 take 1 tablet  Qty: 42 Tablet, Refills: 0      !! acetaminophen (TYLENOL) 325 mg tablet Take 2 Tablets by mouth every six (6) hours as needed for Pain. Qty: 20 Tablet, Refills: 0      benzocaine-menthoL (Cepacol Sore Throat, vivien-men,) 15-2.6 mg lozg lozenge Take 1 Lozenge by mouth every two (2) hours as needed for Sore throat. Qty: 50 Lozenge, Refills: 0      ondansetron hcl (Zofran) 4 mg tablet Take 1 Tablet by mouth every eight (8) hours as needed for Nausea or Vomiting. Qty: 20 Tablet, Refills: 0      guaiFENesin (ROBITUSSIN) 100 mg/5 mL liquid Take 10 mL by mouth two (2) times daily as needed for Cough. Qty: 200 mL, Refills: 0      minoxidiL (LONITEN) 2.5 mg tablet Take 1 Tablet by mouth every twelve (12) hours. Qty: 180 Tablet, Refills: 2      labetaloL (NORMODYNE) 200 mg tablet Take 1 Tablet by mouth every eight (8) hours. Qty: 270 Tablet, Refills: 2      isosorbide mononitrate ER (IMDUR) 30 mg tablet Take 1 Tablet by mouth daily. Qty: 90 Tablet, Refills: 2      doxycycline (VIBRAMYCIN) 100 mg capsule Take 100 mg by mouth two (2) times a day. NIFEdipine ER (ADALAT CC) 60 mg ER tablet Take 1 Tablet by mouth two (2) times a day. Qty: 180 Tablet, Refills: 2      cloNIDine HCL (CATAPRES) 0.1 mg tablet Take 1 Tablet by mouth two (2) times a day.   Qty: 180 Tablet, Refills: 2      hydrALAZINE (APRESOLINE) 25 mg tablet Take 1 Tablet by mouth every eight (8) hours. Qty: 270 Tablet, Refills: 2      albuterol (PROVENTIL HFA, VENTOLIN HFA, PROAIR HFA) 90 mcg/actuation inhaler USE 2 INHALATIONS EVERY 4 HOURS AS NEEDED FOR WHEEZING, ASTHMA ATTACK  Qty: 25.5 g, Refills: 2      glycopyrrolate (ROBINUL) 1 mg tablet TAKE 1 TABLET TWICE A DAY AS NEEDED  Qty: 180 Tab, Refills: 3      calcium acetate,phosphat bind, (PHOSLYRA) 667 mg (169 mg calcium)/5 mL soln ORAL SOLUTION Take 1 Tab by mouth three (3) times daily. cyanocobalamin 1,000 mcg tablet Take 1 Tab by mouth daily. aspirin delayed-release 81 mg tablet Take 1 Tab by mouth daily. multivit-min/FA/lycopen/lutein (CENTRUM SILVER MEN PO) Take 1 Tab by mouth daily. ergocalciferol, vitamin d2, 10 mcg (400 unit) tab Take 1 Tablet by mouth daily. !! acetaminophen (TYLENOL) 325 mg tablet Take 1 Tab by mouth as needed. !! - Potential duplicate medications found. Please discuss with provider. 2.   Follow-up Information       Follow up With Specialties Details Why Contact Info    Tanner Best MD Internal Medicine Physician In 3 days  [address]  [phone]            3. Return to ED if worse   4. Current Discharge Medication List          Diagnosis/Clinical Impression     Clinical Impression:   1. Hypotension due to hypovolemia        Attestations: IRebecca MD, am the primary clinician of record. Please note that this dictation was completed with [employer], the [employer] voice recognition software. Quite often unanticipated grammatical, syntax, homophones, and other interpretive errors are inadvertently transcribed by the computer software. Please disregard these errors. Please excuse any errors that have escaped final proofreading. Thank you.

## 2023-01-12 LAB
ATRIAL RATE: 60 BPM
CALCULATED P AXIS, ECG09: 33 DEGREES
CALCULATED T AXIS, ECG11: 115 DEGREES
DIAGNOSIS, 93000: NORMAL
P-R INTERVAL, ECG05: 216 MS
Q-T INTERVAL, ECG07: 462 MS
QRS DURATION, ECG06: 104 MS
QTC CALCULATION (BEZET), ECG08: 462 MS
VENTRICULAR RATE, ECG03: 60 BPM

## 2023-01-15 LAB
BACTERIA SPEC CULT: NORMAL
SPECIAL REQUESTS,SREQ: NORMAL

## 2023-02-19 PROBLEM — D63.1 ANEMIA DUE TO CHRONIC KIDNEY DISEASE, ON CHRONIC DIALYSIS (HCC): Status: ACTIVE | Noted: 2020-10-16

## 2023-02-19 PROBLEM — N18.6 ANEMIA DUE TO CHRONIC KIDNEY DISEASE, ON CHRONIC DIALYSIS (HCC): Status: ACTIVE | Noted: 2020-10-16

## 2023-02-19 PROBLEM — Z99.2 ANEMIA DUE TO CHRONIC KIDNEY DISEASE, ON CHRONIC DIALYSIS (HCC): Status: ACTIVE | Noted: 2020-10-16

## 2023-02-24 ENCOUNTER — OFFICE VISIT (OUTPATIENT)
Dept: INTERNAL MEDICINE CLINIC | Age: 78
End: 2023-02-24
Payer: MEDICARE

## 2023-02-24 VITALS
OXYGEN SATURATION: 99 % | DIASTOLIC BLOOD PRESSURE: 60 MMHG | HEART RATE: 72 BPM | SYSTOLIC BLOOD PRESSURE: 120 MMHG | RESPIRATION RATE: 20 BRPM | WEIGHT: 143.6 LBS | TEMPERATURE: 98.2 F | HEIGHT: 66 IN | BODY MASS INDEX: 23.08 KG/M2

## 2023-02-24 DIAGNOSIS — Z85.118 HISTORY OF LUNG CANCER: ICD-10-CM

## 2023-02-24 DIAGNOSIS — N18.6 ANEMIA DUE TO CHRONIC KIDNEY DISEASE, ON CHRONIC DIALYSIS (HCC): ICD-10-CM

## 2023-02-24 DIAGNOSIS — I49.5 SINUS NODE DYSFUNCTION (HCC): ICD-10-CM

## 2023-02-24 DIAGNOSIS — I10 ESSENTIAL HYPERTENSION: Primary | ICD-10-CM

## 2023-02-24 DIAGNOSIS — D63.1 ANEMIA DUE TO CHRONIC KIDNEY DISEASE, ON CHRONIC DIALYSIS (HCC): ICD-10-CM

## 2023-02-24 DIAGNOSIS — Z99.2 ANEMIA DUE TO CHRONIC KIDNEY DISEASE, ON CHRONIC DIALYSIS (HCC): ICD-10-CM

## 2023-02-24 DIAGNOSIS — I25.119 ATHEROSCLEROSIS OF NATIVE CORONARY ARTERY OF NATIVE HEART WITH ANGINA PECTORIS (HCC): ICD-10-CM

## 2023-02-24 DIAGNOSIS — N18.6 END STAGE RENAL FAILURE ON DIALYSIS (HCC): ICD-10-CM

## 2023-02-24 DIAGNOSIS — R13.19 OTHER DYSPHAGIA: ICD-10-CM

## 2023-02-24 DIAGNOSIS — Z99.2 END STAGE RENAL FAILURE ON DIALYSIS (HCC): ICD-10-CM

## 2023-02-24 DIAGNOSIS — Z90.2 HISTORY OF LOBECTOMY OF LUNG: ICD-10-CM

## 2023-02-24 DIAGNOSIS — J84.112 IDIOPATHIC PULMONARY FIBROSIS (HCC): ICD-10-CM

## 2023-02-24 DIAGNOSIS — J02.9 SORE THROAT: ICD-10-CM

## 2023-02-24 DIAGNOSIS — J44.9 CHRONIC OBSTRUCTIVE PULMONARY DISEASE, UNSPECIFIED COPD TYPE (HCC): ICD-10-CM

## 2023-02-24 DIAGNOSIS — C34.92 ADENOCARCINOMA OF LEFT LUNG (HCC): ICD-10-CM

## 2023-02-24 PROCEDURE — G8510 SCR DEP NEG, NO PLAN REQD: HCPCS | Performed by: INTERNAL MEDICINE

## 2023-02-24 PROCEDURE — 99214 OFFICE O/P EST MOD 30 MIN: CPT | Performed by: INTERNAL MEDICINE

## 2023-02-24 PROCEDURE — G8427 DOCREV CUR MEDS BY ELIG CLIN: HCPCS | Performed by: INTERNAL MEDICINE

## 2023-02-24 PROCEDURE — 1101F PT FALLS ASSESS-DOCD LE1/YR: CPT | Performed by: INTERNAL MEDICINE

## 2023-02-24 PROCEDURE — G8420 CALC BMI NORM PARAMETERS: HCPCS | Performed by: INTERNAL MEDICINE

## 2023-02-24 PROCEDURE — G8536 NO DOC ELDER MAL SCRN: HCPCS | Performed by: INTERNAL MEDICINE

## 2023-02-24 RX ORDER — OMEPRAZOLE 20 MG/1
CAPSULE, DELAYED RELEASE ORAL
Qty: 60 CAPSULE | Refills: 2 | Status: SHIPPED | OUTPATIENT
Start: 2023-02-24

## 2023-02-24 RX ORDER — CLOBETASOL PROPIONATE 0.5 MG/G
OINTMENT TOPICAL
COMMUNITY
Start: 2023-02-03

## 2023-02-24 RX ORDER — CHOLECALCIFEROL TAB 125 MCG (5000 UNIT) 125 MCG
5000 TAB ORAL DAILY
COMMUNITY

## 2023-02-24 NOTE — PROGRESS NOTES
Jorge Mosqueda (: 1945) is a 69 y.o. male, established patient, here for evaluation of the following chief complaint(s):  Follow Up Chronic Condition, Hypertension, COPD, and Sore Throat (Difficulty in swallowing for last few weeks with solid and liquid. No cough. Was told to discuss about BP medicines.)         ASSESSMENT/PLAN:  Below is the assessment and plan developed based on review of pertinent history, physical exam, labs, studies, and medications. 1. Essential hypertension  2. End stage renal failure on dialysis ([address]  3. Chronic obstructive pulmonary disease, unspecified COPD type ([address]  4. Sinus node dysfunction (HCC)  5. Atherosclerosis of native coronary artery of native heart with angina pectoris ([address]  6. Idiopathic pulmonary fibrosis ([address]  7. Anemia due to chronic kidney disease, on chronic dialysis ([address]  8. History of lobectomy of lung  9. History of lung cancer  10. Adenocarcinoma of left lung ([address]  11. Sore throat  -     REFERRAL TO ENT-OTOLARYNGOLOGY  12. Other dysphagia  -     REFERRAL TO ENT-OTOLARYNGOLOGY  -     REFERRAL TO GASTROENTEROLOGY    Hypertension with ESRD on hemodialysis on  and Saturday. He has AV fistula on left forearm. He is on multiple antihypertensive medicines today his blood pressure in right upper arm is normal and there is no orthostatic hypotension. He says that few weeks ago it was high and he takes dialysis 3 days a week on every other day  and I had a long lengthy discussion I do not know how much she has insight to understand regarding his medical problems and hypertension medicines but explained that it should not drop down too low also because when he takes dialysis it may drop down and he does not take medicine before going to dialysis.   If it is less than 110/60 he should not take afternoon dose and he should take evening dose but today I told him to decrease nifedipine ER to once a day instead of twice a day. His blood pressure if remains less than 100/60 he can take hydralazine 25 mg twice a day instead of 3 times a day    Labetalol 200 mg twice a day instead of 3 times a day    Continue minoxidil 2.5 mg twice a day,    Nifedipine ER decreased to 60 mg once a day for 2 days    Getting clonidine 0.1 mg twice a day. Explained about the side effects of glycopyrrolate and he says he takes every other day. CAD no angina he follows cardiologist and getting isosorbide mononitrate ER from me. Adenocarcinoma lung and metastatic lung cancer. He is off from chemo due to allergic skin reaction. He says he is off from 4-month and he is not sure and now he has sore throat which is persistent for last few weeks so referred him to ENT specialist to rule out any secondary or any serious ENT pathology. Also difficulty in swallowing and not that great historian referred him to gastroenterologist probably he might need EGD because he has difficulty in swallowing with solid and liquid food. He says he does eat milk and cereal but today he has not taken so history does not look reliable but since he is a lung cancer having metastasis in the lymph node in the neck he should be worked up with ENT and gastro. He has multiple ER visit since December. He says that his wife passed away in December. He is not depressed. He prepares his meals by himself and help by his granddaughter. He says he is able to manage and function well. He had visited on fifth December for sore throat and cough also went on seventh December and on 10th December for the skin problem and he had skin problem after having chemotherapy that he is off from chemo for last 4 months and [facility] that he has appointment in few weeks and he visited ER on 10th January for hypotension. He has history of pulmonary fibrosis.   And COPD    Referral given I had a long lengthy discussion in a simple language that he can understand to my best ability. If any concerns do not hesitate to call me. Return in about 4 months (around 6/24/2023) for follow up for chronic condition. SUBJECTIVE/OBJECTIVE:  HPI    Rosibel Nguyen came for regular follow-up and wanted to discuss about his blood pressure medicines. He could not tell me exact how low it was and how high it was but today it is well controlled and there is no orthostatic hypotension blood pressure checked in sitting and standing position only changes made was nifedipine ER decreased to 60 mg/day and verbally told that if his blood pressure is less than 110/60 with dizziness and lightheadedness or less than 100/60 should take only hydralazine twice a day and labetalol twice a day instead of 3 times a day and continue rest of the medicines. He says he is completely asymptomatic he was told by dialysis nurse to discuss. I reviewed ER notes. Complaining of persistent throat pain and difficulty in swallowing. Especially with solid and liquid both. He does not have depression. He lost his wife in December 2022 who was having sickness. He manages his meals by himself and his granddaughter sometimes help. He has skin lesions which is healing from the allergic reaction from chemo and hold on chemo for last 4 months follows oncologist [facility] facility also having history of COPD and pulmonary fibrosis. Used to see cardiologist Dr. Francine Dennis in the past.    Allergies   Allergen Reactions    Pcn [Penicillins] Nausea and Vomiting     ALSO CAUSES NAVARRO'S     Current Outpatient Medications   Medication Sig    cholecalciferol (Vitamin D3) (5000 Units/125 mcg) tab tablet Take 5,000 Units by mouth daily. multivitamin/iron/folic acid (CENTRUM PO) Take 1 Tablet by mouth daily.     omeprazole (PRILOSEC) 20 mg capsule Before meals    acetaminophen (TYLENOL) 325 mg tablet Take 2 Tablets by mouth every six (6) hours as needed for Pain.    minoxidiL (LONITEN) 2.5 mg tablet Take 1 Tablet by mouth every twelve (12) hours. labetaloL (NORMODYNE) 200 mg tablet Take 1 Tablet by mouth every eight (8) hours. isosorbide mononitrate ER (IMDUR) 30 mg tablet Take 1 Tablet by mouth daily. NIFEdipine ER (ADALAT CC) 60 mg ER tablet Take 1 Tablet by mouth two (2) times a day. (Patient taking differently: Take 60 mg by mouth two (2) times a day. Start taking once aday)    cloNIDine HCL (CATAPRES) 0.1 mg tablet Take 1 Tablet by mouth two (2) times a day. hydrALAZINE (APRESOLINE) 25 mg tablet Take 1 Tablet by mouth every eight (8) hours. albuterol (PROVENTIL HFA, VENTOLIN HFA, PROAIR HFA) 90 mcg/actuation inhaler USE 2 INHALATIONS EVERY 4 HOURS AS NEEDED FOR WHEEZING, ASTHMA ATTACK    glycopyrrolate (ROBINUL) 1 mg tablet TAKE 1 TABLET TWICE A DAY AS NEEDED    calcium acetate,phosphat bind, (PHOSLYRA) 667 mg (169 mg calcium)/5 mL soln ORAL SOLUTION Take 1 Tab by mouth three (3) times daily. cyanocobalamin 1,000 mcg tablet Take 1 Tab by mouth daily. aspirin delayed-release 81 mg tablet Take 1 Tab by mouth daily. multivit-min/FA/lycopen/lutein (CENTRUM SILVER MEN PO) Take 1 Tab by mouth daily. clobetasoL (TEMOVATE) 0.05 % ointment APPLY OINTMENT TOPICALLY TO AFFECTED AREA TWICE DAILY TO NEW BLISTERS     No current facility-administered medications for this visit.      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    End stage renal failure on dialysis ([address] 10/16/2020    Essential hypertension 10/16/2020    Excessive salivation 10/16/2020    H/O hernia repair 03/27/2013    Hip pain 10/16/2020    History of lobectomy of lung 10/16/2020    History of lobectomy of lung     Idiopathic pulmonary fibrosis ([facility] 10/16/2020    Olecranon bursitis 10/16/2020    Pleuritic pain 10/16/2020    Sinus node dysfunction ([facility] 10/16/2020     Past Surgical History:   Procedure Laterality Date    HX COLONOSCOPY      HX PACEMAKER      HX PACEMAKER PLACEMENT      [place] UNLISTED PROCEDURE VASCULAR SURGERY       Family History   Problem Relation Age of Onset    Diabetes Mother      Social History     Tobacco Use   Smoking Status Former    Packs/day: 0.50    Years: 20.00    Pack years: 10.00    Types: Cigarettes, Cigars    Quit date: 1/1/2020    Years since quitting: 3.1   Smokeless Tobacco Never   Tobacco Comments    QUIT 1970         Review of Systems   Constitutional: Negative. HENT:  Positive for sore throat. Negative for congestion, ear pain, nosebleeds and tinnitus. Eyes:  Negative for blurred vision. Respiratory:  Negative for cough, shortness of breath and stridor. Cardiovascular: Negative. Gastrointestinal:  Negative for abdominal pain, blood in stool, constipation, diarrhea, heartburn, nausea and vomiting. Difficulty in swallowing for last few weeks. Not clear but he says with solid and liquid both   Genitourinary: Negative. Musculoskeletal: Negative. Neurological:  Negative for dizziness, tingling, tremors, sensory change and headaches. Endo/Heme/Allergies:  Negative for polydipsia. Does not bruise/bleed easily. Psychiatric/Behavioral: Negative. Vitals:    02/24/23 0833 02/24/23 0906 02/24/23 0907   BP: 134/68 128/64 120/60   Pulse: 88  72   Resp: 20     Temp: 98.2 °F (36.8 °C)     TempSrc: Oral     SpO2: 99%     Weight: 143 lb 9.6 oz (65.1 kg)     Height: 5' 6\" (1.676 m)            Physical Exam  Constitutional:       General: He is not in acute distress. Appearance: Normal appearance. He is not ill-appearing or toxic-appearing. HENT:      Right Ear: There is no impacted cerumen. Left Ear: There is no impacted cerumen. Nose: No congestion or rhinorrhea. Mouth/Throat:      Mouth: Mucous membranes are moist.      Pharynx: No oropharyngeal exudate or posterior oropharyngeal erythema. Comments: ? ? Slight erosion with questionable white left palatal arch near the left tonsil. No redness  Eyes:      Pupils: Pupils are equal, round, and reactive to light. Cardiovascular:      Rate and Rhythm: Normal rate and regular rhythm. Pulses: Normal pulses. Heart sounds: No murmur heard. Comments: AV fistula near left wrist  Pulmonary:      Effort: Pulmonary effort is normal.      Breath sounds: No wheezing or rhonchi. Abdominal:      General: Bowel sounds are normal. There is no distension. Palpations: Abdomen is soft. Tenderness: There is no abdominal tenderness. Musculoskeletal:         General: No swelling or tenderness. Cervical back: Neck supple. Right lower leg: No edema. Left lower leg: No edema. Skin:     Findings: Lesion and rash present. Comments: Chronic skin changes now off from chemotherapy questionable side effects from chemo, healing ,no discharge. No bleeding. Neurological:      General: No focal deficit present. Mental Status: He is alert and oriented to person, place, and time. Cranial Nerves: No cranial nerve deficit. Motor: No weakness. Gait: Gait normal.   Psychiatric:         Mood and Affect: Mood normal.         Behavior: Behavior normal.       On this date 02/24/2023 I have spent 40 minutes reviewing previous notes, test results and face to face with the patient discussing the diagnosis and importance of compliance with the treatment plan as well as documenting on the day of the visit. An electronic signature was used to authenticate this note.   -- Shanika Paredes MD

## 2023-02-24 NOTE — PROGRESS NOTES
1. \"Have you been to the ER, urgent care clinic since your last visit? Hospitalized since your last visit? \" Yes When: 01/10/2023 Where: [facility]  Reason for visit: Hypotension    2. \"Have you seen or consulted any other health care providers outside of the [address] [place] [address] since your last visit? \" No     3. For patients aged 39-70: Has the patient had a colonoscopy / FIT/ Cologuard? NA - based on age      If the patient is female:    4. For patients aged 41-77: Has the patient had a mammogram within the past 2 years? NA - based on age or sex      11. For patients aged 21-65: Has the patient had a pap smear?  NA - based on age or sex    Chief Complaint   Patient presents with    Follow Up Chronic Condition    Hypertension    COPD    Sore Throat     Visit Vitals  /68 (BP 1 Location: Right upper arm, BP Patient Position: Sitting, BP Cuff Size: Adult)   Pulse 88   Temp 98.2 °F (36.8 °C) (Oral)   Resp 20   Ht 5' 6\" (1.676 m)   Wt 143 lb 9.6 oz (65.1 kg)   SpO2 99%   BMI 23.18 kg/m²

## 2023-03-20 ENCOUNTER — OFFICE VISIT (OUTPATIENT)
Dept: ENT CLINIC | Age: 78
End: 2023-03-20

## 2023-03-20 VITALS
SYSTOLIC BLOOD PRESSURE: 132 MMHG | HEIGHT: 66 IN | OXYGEN SATURATION: 99 % | BODY MASS INDEX: 22.98 KG/M2 | HEART RATE: 71 BPM | WEIGHT: 143 LBS | DIASTOLIC BLOOD PRESSURE: 68 MMHG

## 2023-03-20 DIAGNOSIS — L98.499 ULCER OF PINNA (HCC): ICD-10-CM

## 2023-03-20 DIAGNOSIS — B37.89 PHARYNGEAL CANDIDIASIS: ICD-10-CM

## 2023-03-20 DIAGNOSIS — B37.0 ORAL CANDIDIASIS: Primary | ICD-10-CM

## 2023-03-20 RX ORDER — FLUCONAZOLE 100 MG/1
100 TABLET ORAL DAILY
Qty: 7 TABLET | Refills: 0 | Status: SHIPPED | OUTPATIENT
Start: 2023-03-20 | End: 2023-03-27

## 2023-03-20 NOTE — PROGRESS NOTES
Subjective: Dave Girard   69 y.o.   1945     New Patient Visit  This is a 69 y.o. male who is here today to discuss sore throat. He states he has been having sore throat since he was diagnosed with COVID-19 in December of 2022. He states he feels the pain when he eats or drinks and tried to swallow. He can feel some discomfort even with he is not swallowing. He cannot describe pain quality, he states that pain is pain. He denies fever, chills, headaches, trouble breathing, nausea, vomiting, abdominal pain. He does endorse some weight loss. He also states since he has been on chemotherapy he has developed sores on the ear pinnae, he states he has had multiple skin issues since beginning chemo. Review of Systems  Review of Systems   Constitutional:  Positive for weight loss. Negative for chills and fever. HENT:  Positive for sore throat. Negative for congestion, ear discharge, ear pain, hearing loss, nosebleeds, sinus pain and tinnitus. Eyes: Negative. Respiratory:  Positive for cough, shortness of breath and wheezing. Negative for stridor. Cardiovascular:  Positive for chest pain, palpitations and claudication. Gastrointestinal:  Positive for constipation and heartburn. Negative for abdominal pain, blood in stool, diarrhea, melena, nausea and vomiting. Genitourinary: Negative. Musculoskeletal:  Positive for joint pain and myalgias. Skin: Negative. Endo/Heme/Allergies: Negative. Psychiatric/Behavioral: Negative.          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    End stage renal failure on dialysis [facility]) 10/16/2020    Essential hypertension 10/16/2020 Excessive salivation 10/16/2020    H/O hernia repair 03/27/2013    Hip pain 10/16/2020    History of lobectomy of lung 10/16/2020    History of lobectomy of lung     Idiopathic pulmonary fibrosis ([facility] 10/16/2020    Olecranon bursitis 10/16/2020    Pleuritic pain 10/16/2020    Sinus node dysfunction ([facility] 10/16/2020     Past Surgical History:   Procedure Laterality Date    HX COLONOSCOPY      HX PACEMAKER      HX PACEMAKER PLACEMENT      [place] UNLISTED PROCEDURE VASCULAR SURGERY        Family History   Problem Relation Age of Onset    Diabetes Mother      Social History     Tobacco Use    Smoking status: Former     Packs/day: 0.50     Years: 20.00     Pack years: 10.00     Types: Cigarettes, Cigars     Quit date: 1/1/2020     Years since quitting: 3.2    Smokeless tobacco: Never    Tobacco comments:     QUIT 1970   Substance Use Topics    Alcohol use: Not Currently      Prior to Admission medications    Medication Sig Start Date End Date Taking? Authorizing Provider   cholecalciferol (VITAMIN D3) (5000 Units/125 mcg) tab tablet Take 5,000 Units by mouth daily. Yes Provider, Historical   multivitamin/iron/folic acid (CENTRUM PO) Take 1 Tablet by mouth daily. Yes Provider, Historical   clobetasoL (TEMOVATE) 0.05 % ointment APPLY OINTMENT TOPICALLY TO AFFECTED AREA TWICE DAILY TO NEW BLISTERS 2/3/23  Yes Provider, Historical   omeprazole (PRILOSEC) 20 mg capsule Before meals 2/24/23  Yes Shantell Adkins MD   acetaminophen (TYLENOL) 325 mg tablet Take 2 Tablets by mouth every six (6) hours as needed for Pain. 12/7/22  Yes Rakan Alas MD   minoxidiL (LONITEN) 2.5 mg tablet Take 1 Tablet by mouth every twelve (12) hours. 9/17/22  Yes Shantell Adkins MD   labetaloL (NORMODYNE) 200 mg tablet Take 1 Tablet by mouth every eight (8) hours. 9/17/22  Yes Shantell Adkins MD   isosorbide mononitrate ER (IMDUR) 30 mg tablet Take 1 Tablet by mouth daily.  9/17/22  Yes Shantell Adkins MD   NIFEdipine ER (ADALAT CC) 60 mg ER tablet Take 1 Tablet by mouth two (2) times a day. Patient taking differently: Take 60 mg by mouth two (2) times a day. Start taking once aday 8/24/22  Yes Gil Arce MD   cloNIDine HCL (CATAPRES) 0.1 mg tablet Take 1 Tablet by mouth two (2) times a day. 8/24/22  Yes Gil Arce MD   hydrALAZINE (APRESOLINE) 25 mg tablet Take 1 Tablet by mouth every eight (8) hours. 8/24/22  Yes Gil Arce MD   albuterol (PROVENTIL HFA, VENTOLIN HFA, PROAIR HFA) 90 mcg/actuation inhaler USE 2 INHALATIONS EVERY 4 HOURS AS NEEDED FOR WHEEZING, ASTHMA ATTACK 10/28/21  Yes Gil Arce MD   glycopyrrolate (ROBINUL) 1 mg tablet TAKE 1 TABLET TWICE A DAY AS NEEDED 3/11/21  Yes Gil Arce MD   calcium acetate,phosphat bind, (PHOSLYRA) 667 mg (169 mg calcium)/5 mL soln ORAL SOLUTION Take 1 Tab by mouth three (3) times daily. 8/26/20  Yes Provider, Historical   cyanocobalamin 1,000 mcg tablet Take 1 Tab by mouth daily. Yes Provider, Historical   aspirin delayed-release 81 mg tablet Take 1 Tab by mouth daily. Yes Provider, Historical   multivit-min/FA/lycopen/lutein (CENTRUM SILVER MEN PO) Take 1 Tab by mouth daily. Yes Provider, Historical        Allergies   Allergen Reactions    Pcn [Penicillins] Nausea and Vomiting     ALSO CAUSES HA'S         Objective:     Visit Vitals  /68 (BP 1 Location: Right upper arm, BP Patient Position: Sitting, BP Cuff Size: Adult)   Pulse 71   Ht 5' 6\" (1.676 m)   Wt 143 lb (64.9 kg)   SpO2 99%   BMI 23.08 kg/m²        Physical Exam  Constitutional:       General: He is not in acute distress. Appearance: He is normal weight. He is not ill-appearing, toxic-appearing or diaphoretic. HENT:      Head: Normocephalic and atraumatic. Right Ear: Tympanic membrane, ear canal and external ear normal. There is no impacted cerumen. Left Ear: Tympanic membrane, ear canal and external ear normal. There is no impacted cerumen.       Ears:        Comments: Open sores on pinnae, flaking dry skin, cultures obtained      Nose: No congestion or rhinorrhea. Mouth/Throat:      Lips: Pink. Mouth: Mucous membranes are moist. Oral lesions present. Palate: Lesions present. Pharynx: Posterior oropharyngeal erythema present. Comments: White lesions on soft palate and pharynx, cultures taken   Eyes:      General: No scleral icterus. Right eye: No discharge. Left eye: No discharge. Extraocular Movements: Extraocular movements intact. Conjunctiva/sclera: Conjunctivae normal.      Pupils: Pupils are equal, round, and reactive to light. Cardiovascular:      Rate and Rhythm: Normal rate and regular rhythm. Pulses: Normal pulses. Heart sounds: Normal heart sounds. Pulmonary:      Effort: Pulmonary effort is normal. No respiratory distress. Breath sounds: Normal breath sounds. No stridor. No wheezing, rhonchi or rales. Chest:      Chest wall: No tenderness. Abdominal:      General: Abdomen is flat. Palpations: Abdomen is soft. Musculoskeletal:         General: No swelling, tenderness, deformity or signs of injury. Normal range of motion. Cervical back: Normal range of motion and neck supple. Skin:     Capillary Refill: Capillary refill takes less than 2 seconds. Coloration: Skin is not jaundiced or pale. Findings: No bruising, erythema, lesion or rash. Neurological:      General: No focal deficit present. Mental Status: He is alert and oriented to person, place, and time. Mental status is at baseline. Psychiatric:         Mood and Affect: Mood normal.         Behavior: Behavior normal.         Thought Content: Thought content normal.         Judgment: Judgment normal.       Assessment/Plan:     Encounter Diagnoses   Name Primary?     Oral candidiasis Yes    Pharyngeal candidiasis     Ulcer of pinna ([address]      Orders Placed This Encounter    fluconazole (DIFLUCAN) 100 mg tablet    magic mouthwash solution     Candidiasis of mouth and pharynx  -Will treat with fluconazole for seven days  - Cultured were obtained and sent to lab  - May require biopsy    Pinnae ulceration  - Likely related to decreased immune function from chemo and opportunistic infection  -Cultures obtained and sent to lab  -Mupirocin applied in office after cultures were obtained          Thank you for referring this patient,    Tj Duartes AGAC[facility]     [address] [facility]  [phone]

## 2023-03-20 NOTE — PROGRESS NOTES
Chief Complaint   Patient presents with    New Patient     Throat pain   Started in December - started after dx with Covid in December   Hurts when he swallows (food and drink)        Visit Vitals  /68 (BP 1 Location: Right upper arm, BP Patient Position: Sitting, BP Cuff Size: Adult)   Pulse 71   Ht 5' 6\" (1.676 m)   Wt 143 lb (64.9 kg)   SpO2 99%   BMI 23.08 kg/m²

## 2023-03-25 LAB
S PYO THROAT QL CULT: NEGATIVE
SPECIMEN STATUS REPORT, ROLRST: NORMAL

## 2023-03-27 LAB
BACTERIA SPEC AEROBE CULT: ABNORMAL
BACTERIA SPEC ANAEROBE CULT: ABNORMAL
SPECIMEN STATUS REPORT, ROLRST: NORMAL

## 2023-05-10 ENCOUNTER — OFFICE VISIT (OUTPATIENT)
Age: 78
End: 2023-05-10

## 2023-05-10 VITALS
OXYGEN SATURATION: 99 % | BODY MASS INDEX: 22.98 KG/M2 | WEIGHT: 143 LBS | SYSTOLIC BLOOD PRESSURE: 142 MMHG | DIASTOLIC BLOOD PRESSURE: 84 MMHG | HEIGHT: 66 IN | HEART RATE: 74 BPM

## 2023-05-10 DIAGNOSIS — K12.30 MUCOSITIS AFTER THERAPY: Primary | ICD-10-CM

## 2023-05-10 DIAGNOSIS — J39.2: ICD-10-CM

## 2023-05-10 DIAGNOSIS — R13.10 DYSPHAGIA, UNSPECIFIED TYPE: ICD-10-CM

## 2023-05-10 ASSESSMENT — ENCOUNTER SYMPTOMS
ALLERGIC/IMMUNOLOGIC NEGATIVE: 1
SORE THROAT: 1
TROUBLE SWALLOWING: 1
GASTROINTESTINAL NEGATIVE: 1
EYES NEGATIVE: 1
RESPIRATORY NEGATIVE: 1

## 2023-05-10 ASSESSMENT — PATIENT HEALTH QUESTIONNAIRE - PHQ9
1. LITTLE INTEREST OR PLEASURE IN DOING THINGS: 0
SUM OF ALL RESPONSES TO PHQ9 QUESTIONS 1 & 2: 0
SUM OF ALL RESPONSES TO PHQ QUESTIONS 1-9: 0
2. FEELING DOWN, DEPRESSED OR HOPELESS: 0
SUM OF ALL RESPONSES TO PHQ QUESTIONS 1-9: 0

## 2023-05-10 NOTE — PROGRESS NOTES
Normal range of motion. Cervical back: Normal range of motion and neck supple. No rigidity or tenderness. Right lower leg: No edema. Left lower leg: No edema. Lymphadenopathy:      Cervical: No cervical adenopathy. Skin:     General: Skin is dry. Capillary Refill: Capillary refill takes less than 2 seconds. Neurological:      General: No focal deficit present. Mental Status: He is alert and oriented to person, place, and time. Cranial Nerves: No cranial nerve deficit. Sensory: No sensory deficit. Motor: No weakness. Coordination: Coordination normal.      Gait: Gait normal.      Deep Tendon Reflexes: Reflexes normal.   Psychiatric:         Mood and Affect: Mood normal.         Behavior: Behavior normal.         Thought Content: Thought content normal.         Judgment: Judgment normal.      PROCEDURE: FLEXIBLE FIBEROPTIC LARYNGOSCOPY     Preoperative Diagnosis:  Dysphagia     Postoperative Diagnosis: Same     Procedure: Flexible Fiberoptic Laryngoscopy     Anesthesia: Topical 4% Lidocaine, Oxymetazoline     Description of Procedure: Verbal informed consent was obtained. After application of topical anesthetic and decongestant, the flexible fiberoptic endoscope was introduced to the patient's nare. It was passed through the nose and into the pharynx. The scope was then withdrawn and repeated on the opposing nare. The patient tolerated the procedure well. Findings: Normal nasal cavity, no polyposis or purulence. Middle meatus clear bilaterally. Nasopharynx with benign midline cyst. Base of tongue, vallecula & epiglottis unremarkable. Clear pyriform sinus. Vocal folds without lesions. Normal mobility. Visualized subglottis appears patent. Normal AE folds and interarytenoid space. No blair lesions. Assessment/Plan:     Encounter Diagnoses   Name Primary?     Mucositis after therapy Yes    Dysphagia, unspecified type     Pharynx or nasopharynx cyst

## 2023-05-26 ENCOUNTER — TELEPHONE (OUTPATIENT)
Age: 78
End: 2023-05-26

## 2023-06-19 ENCOUNTER — HOSPITAL ENCOUNTER (EMERGENCY)
Facility: HOSPITAL | Age: 78
Discharge: HOME | End: 2023-06-19
Attending: EMERGENCY MEDICINE
Payer: MEDICARE

## 2023-06-19 VITALS
HEIGHT: 66 IN | DIASTOLIC BLOOD PRESSURE: 80 MMHG | OXYGEN SATURATION: 100 % | BODY MASS INDEX: 24.75 KG/M2 | TEMPERATURE: 98.3 F | SYSTOLIC BLOOD PRESSURE: 142 MMHG | WEIGHT: 154 LBS | HEART RATE: 83 BPM | RESPIRATION RATE: 18 BRPM

## 2023-06-19 DIAGNOSIS — E87.5 HYPERKALEMIA: Primary | ICD-10-CM

## 2023-06-19 LAB
ALBUMIN SERPL-MCNC: 3.2 G/DL (ref 3.5–5)
ALBUMIN/GLOB SERPL: 0.7 (ref 1.1–2.2)
ALP SERPL-CCNC: 121 U/L (ref 45–117)
ALT SERPL-CCNC: 20 U/L (ref 12–78)
ANION GAP SERPL CALC-SCNC: 7 MMOL/L (ref 5–15)
AST SERPL W P-5'-P-CCNC: 19 U/L (ref 15–37)
BILIRUB SERPL-MCNC: 0.5 MG/DL (ref 0.2–1)
BUN SERPL-MCNC: 57 MG/DL (ref 6–20)
BUN/CREAT SERPL: 5 (ref 12–20)
CA-I BLD-MCNC: 9.2 MG/DL (ref 8.5–10.1)
CHLORIDE SERPL-SCNC: 98 MMOL/L (ref 97–108)
CO2 SERPL-SCNC: 28 MMOL/L (ref 21–32)
CREAT SERPL-MCNC: 12.5 MG/DL (ref 0.7–1.3)
GLOBULIN SER CALC-MCNC: 4.4 G/DL (ref 2–4)
GLUCOSE SERPL-MCNC: 84 MG/DL (ref 65–100)
POTASSIUM SERPL-SCNC: 6.9 MMOL/L (ref 3.5–5.1)
PROT SERPL-MCNC: 7.6 G/DL (ref 6.4–8.2)
SODIUM SERPL-SCNC: 133 MMOL/L (ref 136–145)

## 2023-06-19 PROCEDURE — 99284 EMERGENCY DEPT VISIT MOD MDM: CPT

## 2023-06-19 PROCEDURE — G0257 UNSCHED DIALYSIS ESRD PT HOS: HCPCS

## 2023-06-19 PROCEDURE — 36415 COLL VENOUS BLD VENIPUNCTURE: CPT

## 2023-06-19 PROCEDURE — 93005 ELECTROCARDIOGRAM TRACING: CPT | Performed by: EMERGENCY MEDICINE

## 2023-06-19 PROCEDURE — 2709999900 HC NON-CHARGEABLE SUPPLY

## 2023-06-19 PROCEDURE — 80053 COMPREHEN METABOLIC PANEL: CPT

## 2023-06-19 ASSESSMENT — LIFESTYLE VARIABLES
HOW OFTEN DO YOU HAVE A DRINK CONTAINING ALCOHOL: NEVER
HOW MANY STANDARD DRINKS CONTAINING ALCOHOL DO YOU HAVE ON A TYPICAL DAY: PATIENT DOES NOT DRINK

## 2023-06-19 ASSESSMENT — PAIN - FUNCTIONAL ASSESSMENT
PAIN_FUNCTIONAL_ASSESSMENT: NONE - DENIES PAIN
PAIN_FUNCTIONAL_ASSESSMENT: NONE - DENIES PAIN

## 2023-06-19 ASSESSMENT — PAIN SCALES - GENERAL
PAINLEVEL_OUTOF10: 0
PAINLEVEL_OUTOF10: 0

## 2023-06-19 NOTE — ED PROVIDER NOTES
100%   Weight:    69.9 kg (154 lb)   Height:    1.676 m (5' 6\")        ED COURSE  ED Course as of 06/19/23 2220 Mon Jun 19, 2023   1741 Spoke with Dr. Ramone Kurtz and the dialysis nurse and will get him upstairs to get dialyzed [KK]      ED Course User Index  [KK] Anand Roberts MD       Disposition Considerations (Tests not done, Shared Decision Making, Pt Expectation of Test or Treatment.): See MDM    Patient was given the following medications:  Medications - No data to display    CONSULTS: (Who and What was discussed)  IP CONSULT TO NEPHROLOGY     Social Determinants affecting Dx or Tx: None    Smoking Cessation: Not Applicable    PROCEDURES   Unless otherwise noted above, none. Procedures      CRITICAL CARE TIME   Patient does not meet Critical Care Time, 0 minutes    FINAL IMPRESSION     1. Hyperkalemia          DISPOSITION/PLAN   DISPOSITION Decision To Discharge 06/19/2023 08:43:21 PM    Discharge Note: The patient is stable for discharge home. The signs, symptoms, diagnosis, and discharge instructions have been discussed, understanding conveyed, and agreed upon. The patient is to follow up as recommended or return to ER should their symptoms worsen.       PATIENT REFERRED TO:  Melba Curiel MD  [address] [place] [address],[address], [facility]  [phone]              DISCHARGE MEDICATIONS:     Medication List        ASK your doctor about these medications      acetaminophen 325 MG tablet  Commonly known as: TYLENOL     albuterol sulfate  (90 Base) MCG/ACT inhaler  Commonly known as: PROVENTIL;VENTOLIN;PROAIR     aspirin 81 MG EC tablet     calcium acetate 667 MG/5ML Soln  Commonly known as: PHOSLYRA     clobetasol 0.05 % ointment  Commonly known as: TEMOVATE     cloNIDine 0.1 MG tablet  Commonly known as: CATAPRES     cyanocobalamin 1000 MCG tablet     glycopyrrolate 1 MG tablet  Commonly known as: ROBINUL     hydrALAZINE 25 MG tablet  Commonly known as: APRESOLINE     isosorbide mononitrate

## 2023-06-19 NOTE — ED TRIAGE NOTES
Pt states he was suppose to have a \"scope\" procedure at [facility] and got a call today that his Potassium levels were high and to go to the ER.

## 2023-06-20 NOTE — DIALYSIS
Pt kathya 2 hour hemodialysis well.    2 liters net fluid removed.    Report given to [facility] in ED

## 2023-06-21 LAB
EKG ATRIAL RATE: 63 BPM
EKG DIAGNOSIS: NORMAL
EKG P AXIS: -18 DEGREES
EKG P-R INTERVAL: 218 MS
EKG Q-T INTERVAL: 424 MS
EKG QRS DURATION: 106 MS
EKG QTC CALCULATION (BAZETT): 433 MS
EKG R AXIS: -6 DEGREES
EKG T AXIS: 99 DEGREES
EKG VENTRICULAR RATE: 63 BPM

## 2023-06-24 PROBLEM — I50.9 CHRONIC HEART FAILURE, UNSPECIFIED HEART FAILURE TYPE (HCC): Status: ACTIVE | Noted: 2023-06-24

## 2023-12-31 PROBLEM — D64.9 ANEMIA: Status: RESOLVED | Noted: 2020-10-16 | Resolved: 2023-12-31

## 2023-12-31 PROBLEM — N18.6 ESRD (END STAGE RENAL DISEASE) (HCC): Status: ACTIVE | Noted: 2020-10-16

## 2024-01-05 ENCOUNTER — OFFICE VISIT (OUTPATIENT)
Facility: CLINIC | Age: 79
End: 2024-01-05

## 2024-01-05 VITALS
HEIGHT: 66 IN | TEMPERATURE: 98.1 F | SYSTOLIC BLOOD PRESSURE: 180 MMHG | DIASTOLIC BLOOD PRESSURE: 80 MMHG | WEIGHT: 150 LBS | HEART RATE: 72 BPM | OXYGEN SATURATION: 99 % | BODY MASS INDEX: 24.11 KG/M2

## 2024-01-05 DIAGNOSIS — N18.6 ESRD (END STAGE RENAL DISEASE) (HCC): ICD-10-CM

## 2024-01-05 DIAGNOSIS — D63.1 ANEMIA DUE TO CHRONIC KIDNEY DISEASE, ON CHRONIC DIALYSIS (HCC): ICD-10-CM

## 2024-01-05 DIAGNOSIS — J84.112 IDIOPATHIC PULMONARY FIBROSIS (HCC): ICD-10-CM

## 2024-01-05 DIAGNOSIS — N18.6 ANEMIA DUE TO CHRONIC KIDNEY DISEASE, ON CHRONIC DIALYSIS (HCC): ICD-10-CM

## 2024-01-05 DIAGNOSIS — I49.5 SINUS NODE DYSFUNCTION (HCC): ICD-10-CM

## 2024-01-05 DIAGNOSIS — Z90.2 HISTORY OF LOBECTOMY OF LUNG: ICD-10-CM

## 2024-01-05 DIAGNOSIS — I10 ESSENTIAL HYPERTENSION: Primary | ICD-10-CM

## 2024-01-05 DIAGNOSIS — Z95.0 CARDIAC PACEMAKER IN SITU: ICD-10-CM

## 2024-01-05 DIAGNOSIS — I25.10 CORONARY ARTERIOSCLEROSIS IN NATIVE ARTERY: ICD-10-CM

## 2024-01-05 DIAGNOSIS — I25.119 ATHEROSCLEROSIS OF NATIVE CORONARY ARTERY OF NATIVE HEART WITH ANGINA PECTORIS (HCC): ICD-10-CM

## 2024-01-05 DIAGNOSIS — K11.7 EXCESSIVE SALIVATION: ICD-10-CM

## 2024-01-05 DIAGNOSIS — R63.0 DECREASED APPETITE: ICD-10-CM

## 2024-01-05 DIAGNOSIS — Z99.2 ANEMIA DUE TO CHRONIC KIDNEY DISEASE, ON CHRONIC DIALYSIS (HCC): ICD-10-CM

## 2024-01-05 DIAGNOSIS — J44.9 CHRONIC OBSTRUCTIVE PULMONARY DISEASE, UNSPECIFIED COPD TYPE (HCC): ICD-10-CM

## 2024-01-05 DIAGNOSIS — I50.9 CHRONIC HEART FAILURE, UNSPECIFIED HEART FAILURE TYPE (HCC): ICD-10-CM

## 2024-01-05 DIAGNOSIS — C34.92 ADENOCARCINOMA OF LEFT LUNG (HCC): ICD-10-CM

## 2024-01-05 RX ORDER — LABETALOL 300 MG/1
300 TABLET, FILM COATED ORAL 2 TIMES DAILY
Qty: 270 TABLET | Refills: 2 | Status: SHIPPED | OUTPATIENT
Start: 2024-01-05

## 2024-01-05 RX ORDER — CINACALCET 30 MG/1
30 TABLET, FILM COATED ORAL DAILY
COMMUNITY

## 2024-01-05 RX ORDER — MIRTAZAPINE 7.5 MG/1
7.5 TABLET, FILM COATED ORAL NIGHTLY
Qty: 90 TABLET | Refills: 1 | Status: SHIPPED | OUTPATIENT
Start: 2024-01-05

## 2024-01-05 RX ORDER — HYDRALAZINE HYDROCHLORIDE 50 MG/1
50 TABLET, FILM COATED ORAL EVERY 12 HOURS
Qty: 180 TABLET | Refills: 2 | Status: SHIPPED | OUTPATIENT
Start: 2024-01-05

## 2024-01-05 SDOH — ECONOMIC STABILITY: INCOME INSECURITY: HOW HARD IS IT FOR YOU TO PAY FOR THE VERY BASICS LIKE FOOD, HOUSING, MEDICAL CARE, AND HEATING?: NOT HARD AT ALL

## 2024-01-05 SDOH — ECONOMIC STABILITY: HOUSING INSECURITY
IN THE LAST 12 MONTHS, WAS THERE A TIME WHEN YOU DID NOT HAVE A STEADY PLACE TO SLEEP OR SLEPT IN A SHELTER (INCLUDING NOW)?: NO

## 2024-01-05 SDOH — ECONOMIC STABILITY: FOOD INSECURITY: WITHIN THE PAST 12 MONTHS, THE FOOD YOU BOUGHT JUST DIDN'T LAST AND YOU DIDN'T HAVE MONEY TO GET MORE.: NEVER TRUE

## 2024-01-05 SDOH — ECONOMIC STABILITY: FOOD INSECURITY: WITHIN THE PAST 12 MONTHS, YOU WORRIED THAT YOUR FOOD WOULD RUN OUT BEFORE YOU GOT MONEY TO BUY MORE.: NEVER TRUE

## 2024-01-05 ASSESSMENT — ENCOUNTER SYMPTOMS
EYES NEGATIVE: 1
RESPIRATORY NEGATIVE: 1
ALLERGIC/IMMUNOLOGIC NEGATIVE: 1
GASTROINTESTINAL NEGATIVE: 1

## 2024-01-05 ASSESSMENT — PATIENT HEALTH QUESTIONNAIRE - PHQ9
SUM OF ALL RESPONSES TO PHQ QUESTIONS 1-9: 0
2. FEELING DOWN, DEPRESSED OR HOPELESS: 0
SUM OF ALL RESPONSES TO PHQ QUESTIONS 1-9: 0
1. LITTLE INTEREST OR PLEASURE IN DOING THINGS: 0
SUM OF ALL RESPONSES TO PHQ9 QUESTIONS 1 & 2: 0
SUM OF ALL RESPONSES TO PHQ QUESTIONS 1-9: 0
SUM OF ALL RESPONSES TO PHQ QUESTIONS 1-9: 0

## 2024-01-05 NOTE — PROGRESS NOTES
.  Chief Complaint   Patient presents with    Follow-up Chronic Condition    Hypertension       .1. Have you been to the ER, urgent care clinic since your last visit?  Hospitalized since your last visit?Yes. [facility] 06/19/2023 for abnormal lab.    2. Have you seen or consulted any other health care providers outside of the [facility] since your last visit?  Include any pap smears or colon screening. No      .BP (!) 200/96 (Site: Right Upper Arm, Position: Sitting, Cuff Size: Medium Adult)   Pulse 76   Temp 98.1 °F (36.7 °C) (Oral)   Ht 1.676 m (5' 6\")   Wt 68 kg (150 lb)   SpO2 99%   BMI 24.21 kg/m²         
10/16/2020    Pleuritic pain 10/16/2020    Sinus node dysfunction (HCC) 10/16/2020     Past Surgical History:   Procedure Laterality Date    COLONOSCOPY      PACEMAKER      PACEMAKER PLACEMENT      VASCULAR SURGERY       Family History   Problem Relation Age of Onset    Diabetes Mother      Social History     Tobacco Use   Smoking Status Former    Current packs/day: 0.00    Types: Cigarettes    Quit date: 2020    Years since quittin.0   Smokeless Tobacco Never     Review of Systems   Constitutional: Negative.    HENT: Negative.     Eyes: Negative.    Respiratory: Negative.     Cardiovascular: Negative.    Gastrointestinal: Negative.    Endocrine: Negative.    Genitourinary: Negative.    Musculoskeletal: Negative.    Skin: Negative.    Allergic/Immunologic: Negative.    Neurological: Negative.    Hematological: Negative.    Psychiatric/Behavioral: Negative.         Vitals:    24 1201 24 1204 24 1223   BP: (!) 198/98 (!) 200/96 (!) 180/80   Site: Right Upper Arm Right Upper Arm Right Upper Arm   Position: Sitting Sitting Sitting   Cuff Size: Medium Adult Medium Adult Medium Adult   Pulse: 76 72    Temp: 98.1 °F (36.7 °C)     TempSrc: Oral     SpO2: 99%     Weight: 68 kg (150 lb)     Height: 1.676 m (5' 6\")            Physical Exam  Vitals and nursing note reviewed.   Constitutional:       General: He is not in acute distress.     Appearance: Normal appearance. He is not ill-appearing or diaphoretic.   HENT:      Mouth/Throat:      Mouth: Mucous membranes are moist.   Eyes:      General: No scleral icterus.     Conjunctiva/sclera: Conjunctivae normal.      Pupils: Pupils are equal, round, and reactive to light.   Neck:      Vascular: No carotid bruit.   Cardiovascular:      Rate and Rhythm: Normal rate and regular rhythm.      Pulses: Normal pulses.   Pulmonary:      Effort: Pulmonary effort is normal.      Breath sounds: Normal breath sounds. No rhonchi.   Abdominal:      General: Bowel

## 2024-04-05 ENCOUNTER — OFFICE VISIT (OUTPATIENT)
Facility: CLINIC | Age: 79
End: 2024-04-05
Payer: MEDICARE

## 2024-04-05 VITALS
BODY MASS INDEX: 20.09 KG/M2 | TEMPERATURE: 98.2 F | HEART RATE: 56 BPM | DIASTOLIC BLOOD PRESSURE: 64 MMHG | HEIGHT: 66 IN | OXYGEN SATURATION: 99 % | WEIGHT: 125 LBS | SYSTOLIC BLOOD PRESSURE: 138 MMHG

## 2024-04-05 DIAGNOSIS — Z95.0 CARDIAC PACEMAKER IN SITU: ICD-10-CM

## 2024-04-05 DIAGNOSIS — I49.5 SINUS NODE DYSFUNCTION (HCC): ICD-10-CM

## 2024-04-05 DIAGNOSIS — Z85.118 HISTORY OF LUNG CANCER: ICD-10-CM

## 2024-04-05 DIAGNOSIS — I50.9 CHRONIC HEART FAILURE, UNSPECIFIED HEART FAILURE TYPE (HCC): ICD-10-CM

## 2024-04-05 DIAGNOSIS — J44.9 CHRONIC OBSTRUCTIVE PULMONARY DISEASE, UNSPECIFIED COPD TYPE (HCC): ICD-10-CM

## 2024-04-05 DIAGNOSIS — Z90.2 HISTORY OF LOBECTOMY OF LUNG: ICD-10-CM

## 2024-04-05 DIAGNOSIS — Z00.00 MEDICARE ANNUAL WELLNESS VISIT, SUBSEQUENT: ICD-10-CM

## 2024-04-05 DIAGNOSIS — J84.112 IDIOPATHIC PULMONARY FIBROSIS (HCC): ICD-10-CM

## 2024-04-05 DIAGNOSIS — N18.6 ESRD (END STAGE RENAL DISEASE) (HCC): ICD-10-CM

## 2024-04-05 DIAGNOSIS — I25.119 ATHEROSCLEROSIS OF NATIVE CORONARY ARTERY OF NATIVE HEART WITH ANGINA PECTORIS (HCC): ICD-10-CM

## 2024-04-05 DIAGNOSIS — I10 ESSENTIAL HYPERTENSION: ICD-10-CM

## 2024-04-05 PROCEDURE — G0439 PPPS, SUBSEQ VISIT: HCPCS | Performed by: INTERNAL MEDICINE

## 2024-04-05 PROCEDURE — 3078F DIAST BP <80 MM HG: CPT | Performed by: INTERNAL MEDICINE

## 2024-04-05 PROCEDURE — 3075F SYST BP GE 130 - 139MM HG: CPT | Performed by: INTERNAL MEDICINE

## 2024-04-05 PROCEDURE — 1123F ACP DISCUSS/DSCN MKR DOCD: CPT | Performed by: INTERNAL MEDICINE

## 2024-04-05 ASSESSMENT — PATIENT HEALTH QUESTIONNAIRE - PHQ9
SUM OF ALL RESPONSES TO PHQ QUESTIONS 1-9: 0
1. LITTLE INTEREST OR PLEASURE IN DOING THINGS: NOT AT ALL
2. FEELING DOWN, DEPRESSED OR HOPELESS: NOT AT ALL
SUM OF ALL RESPONSES TO PHQ QUESTIONS 1-9: 0
SUM OF ALL RESPONSES TO PHQ QUESTIONS 1-9: 0
SUM OF ALL RESPONSES TO PHQ9 QUESTIONS 1 & 2: 0
SUM OF ALL RESPONSES TO PHQ QUESTIONS 1-9: 0

## 2024-04-05 ASSESSMENT — LIFESTYLE VARIABLES
HOW MANY STANDARD DRINKS CONTAINING ALCOHOL DO YOU HAVE ON A TYPICAL DAY: PATIENT DOES NOT DRINK
HOW OFTEN DO YOU HAVE A DRINK CONTAINING ALCOHOL: NEVER

## 2024-04-05 NOTE — PROGRESS NOTES
Chief Complaint   Patient presents with    Medicare AWV    /60 (Site: Right Upper Arm, Position: Sitting, Cuff Size: Small Adult)   Pulse 56   Temp 98.2 °F (36.8 °C) (Oral)   Ht 1.676 m (5' 6\")   Wt 56.7 kg (125 lb)   SpO2 99%   BMI 20.18 kg/m²  1. Have you been to the ER, urgent care clinic since your last visit?  Hospitalized since your last visit?No    2. Have you seen or consulted any other health care providers outside of the [facility] since your last visit?  Include any pap smears or colon screening. No

## 2024-04-05 NOTE — PROGRESS NOTES
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  Recommended screening schedule for the next 5-10 years is provided to the patient in written form: see Patient Instructions/AVS.    Subsequent Medicare wellness visit done.  His blood pressure is controlled.  He has multiple comorbidities.  He is getting hemodialysis 3 days a week.  He follows pulmonologist but he says that he is in remission from his lung cancer.  Now after making changes in the blood pressure medicine last time his blood pressure is controlled.  I do not have any EF right now..    He follows ophthalmologist.  He has no hearing problems.  He has no advanced directory.  Explained.  He is non-smoker.     Return in about 6 months (around 10/5/2024) for follow for chronic condition.     Subjective       Patient's complete Health Risk Assessment and screening values have been reviewed and are found in Flowsheets. The following problems were reviewed today and where indicated follow up appointments were made and/or referrals ordered.    Positive Risk Factor Screenings with Interventions:                      Advanced Directives:  Do you have a Living Will?: (!) No (brooklynanniater is in charge of his affairs)    Intervention:  has NO advanced directive - information provided                     Objective   Vitals:    04/05/24 0913 04/05/24 0949   BP: 138/60

## 2024-07-12 ENCOUNTER — TELEPHONE (OUTPATIENT)
Facility: CLINIC | Age: 79
End: 2024-07-12

## 2024-07-12 NOTE — TELEPHONE ENCOUNTER
Patient passed away at his residence on 2024.  [place]. Fel[place]. They stated they needed the death certificate signed by someone as soon as possible so they can move forward.